# Patient Record
Sex: MALE | Race: WHITE | NOT HISPANIC OR LATINO | Employment: FULL TIME | URBAN - METROPOLITAN AREA
[De-identification: names, ages, dates, MRNs, and addresses within clinical notes are randomized per-mention and may not be internally consistent; named-entity substitution may affect disease eponyms.]

---

## 2020-09-03 NOTE — PROGRESS NOTES
SUBJECTIVE:   Leonel is a 20 year old male who presents to clinic today to establish care and for annual wellness exam.    Previously was being seen by Pediatric Services in [facility]  Going to start EMT course soon    - Last March on way home from college at start of pandemic, had an illness  - clammy, fever to 103, fine the next day, then developed shortness of breath. No other respiratory symptoms  - had COVID-19 antibody test in June which was negative    - is a rower in college  - restarted erging and strength training the week after his initial illness in March and hasn't really changed    - does strength training (pushups, pull ups, squats) 2-3 days week. No symptoms during strength training and feels that he is at his normal capacity with these exercises  - Erging 3 days a week, usually about an hour. How he feels doing this varies day to day without a clear reason. Some days is able to go at speed he was at in the Spring and other days will be much slower. Feels like he is having to put in much more effort to get the same speed on these slower days. Heart rate will also be faster during these slower days. Also feels like he is having a harder time breathing, having to take deeper breaths on those days (this particularly point has been less so the past month compared to earlier in the summer)  - running two to three times a week. Runs for 45 minutes two days a week and then hill sprints 30 minutes one day. During hill sprints he will consistently get uncomfortable left central chest pressure. Tries to push through it but eventually the pressure makes him stop. When resting, the pressure goes away after about 15 seconds, but if he tries to resume activity the pressure will restart immediately.    - when school was in session was erging an hour a day and strength training 2-3 days a week  - has not been able to return to that level of activity    - no coughing or wheezing  - no leg swelling  - no orthopnea or  PND  - MGM with asthma, otherwise no family or personal history of reactive airway disease  - never smoker  - one syncopal episode years ago after standing quickly    # Health Maintenance  - HIV Screening: March 2020, negative  - STI Screening: March 2020, negative, not sexually active since then  - BP:   BP Readings from Last 3 Encounters:   09/04/20 106/65   - (+) seatbelt use, (+) helmet, (+) smoke detector     Today's PHQ-2 Score:   PHQ-2 ( 1999 Pfizer) 9/4/2020   Q1: Little interest or pleasure in doing things 0   Q2: Feeling down, depressed or hopeless 0   PHQ-2 Score 0     Review of Systems:   Constitutional - no fevers, chills, night sweats, unintentional weight loss/gain   Eyes - no vision concerns   Ears/Nose/Throat - no hearing concerns, no dysphagia/odynophagia   Cardiovascular - as above, no palpitations   Pulmonary - as above, no wheezing, coughing   GI - no abdominal pain, constipation, diarrhea, nausea, vomiting    - no dysuria, polyuria, hematuria   Musculoskeletal - no joint or muscle pain  Integument - no rash   Neuro - no weakness, numbness, paresthesia   Heme - no easy bruising/bleeding   Endocrine - no polyuria, weight loss/gain, dry skin, excessive sweating, hair loss   Psychiatric - no feelings of depressed mood or anhedonia in past 2 weeks   Allergic/Immunologic - no history of anaphylaxis, no history of recurrent infections    History reviewed. No pertinent past medical history.  History reviewed. No pertinent surgical history.  Family History   Problem Relation Age of Onset     Hypertension Mother      No Known Problems Father      No Known Problems Brother      Asthma Maternal Grandmother      Atrial fibrillation Maternal Grandmother      Cerebrovascular Disease Maternal Grandmother      Cerebrovascular Disease Other      Cancer No family hx of      Heart Disease No family hx of      Diabetes No family hx of      Social History     Tobacco Use     Smoking status: Never Smoker      "Smokeless tobacco: Never Used   Substance Use Topics     Alcohol use: Never     Frequency: Never     Drug use: Never     Social History     Social History Narrative    Goes to [employer]    Taking off at least Fall 2020 due to pandemic    Living with parents    Rows for college team    Thinking of majoring in history/theater/philosophy    Think about teaching or law school       No current outpatient medications on file.     No current facility-administered medications for this visit.      I have reviewed the patient's past medical, surgical, family, and social history.     OBJECTIVE:   /65 (BP Location: Right arm, Patient Position: Sitting, Cuff Size: Adult Regular)   Pulse 64   Temp 97.4  F (36.3  C) (Skin)   Resp 14   Ht 1.929 m (6' 3.95\")   Wt 85.2 kg (187 lb 12 oz)   SpO2 97%   BMI 22.89 kg/m      Constitutional: well-appearing, appears stated age  Eyes: conjunctivae without erythema, sclera anicteric. Pupils equal, round, and reactive to light.   ENT: oropharynx clear, TM grey bilateral  Cardiac: regular rate and rhythm, normal S1/S2, no murmur/rubs/gallops  Respiratory: lungs clear to auscultation bilaterally, normal work of breathing, no wheezes/crackles  GI: abdomen soft, non-tender, non-distended  Extremities: warm and well perfused, radial pulses 2+ and equal, cap refill brisk.  Lymph: no cervical or supraclavicular lymphadenopathy  Skin: no rashes, lesions, or wounds  Psych: affect is full and appropriate, speech is fluent and non-pressured      ASSESSMENT AND PLAN:     COUNSELING:   Reviewed preventive health counseling, as reflected in patient instructions       Regular exercise       Healthy diet/nutrition       Immunizations    Vaccinated for: Human Papillomavirus      (Z00.00) Visit for well [place] health check  (primary encounter diagnosis)  Comment: Age appropriate screening and preventive services provided. TB screening for EMT course. BP normal. BMI normal. Depression screening " negative.   Plan: Quantiferon TB Gold Plus, VACCINE         ADMINISTRATION, INITIAL, HC HPV VAC 9V 3 DOSE         IM,     (R68.89) Decreased exercise tolerance  Comment: Concerning that this is limiting him from peak performance. ECG today showed Sinus bradycardia, 58 bpm, deep Q waves in III, aVR - presumably normal variant. No previous ECG available to compare. Will send to cardiology for additional evaluation given the effect on his performance. Of note, he had a normal holter and echo was November 2019 in [facility] system for an unrelated complaint.   Plan: CBC with Diff Plt ([facility]), Comprehensive         Metabolic Panel ([facility]), EKG 12-lead         complete w/read - Clinics          Kevon See MD  [facility]  09/04/2020, 2:34 PM

## 2020-09-04 ENCOUNTER — OFFICE VISIT (OUTPATIENT)
Dept: FAMILY MEDICINE | Facility: CLINIC | Age: 20
End: 2020-09-04
Payer: COMMERCIAL

## 2020-09-04 VITALS
TEMPERATURE: 97.4 F | HEIGHT: 76 IN | HEART RATE: 64 BPM | DIASTOLIC BLOOD PRESSURE: 65 MMHG | OXYGEN SATURATION: 97 % | BODY MASS INDEX: 22.86 KG/M2 | WEIGHT: 187.75 LBS | SYSTOLIC BLOOD PRESSURE: 106 MMHG | RESPIRATION RATE: 14 BRPM

## 2020-09-04 DIAGNOSIS — Z00.00 VISIT FOR WELL MAN HEALTH CHECK: Primary | ICD-10-CM

## 2020-09-04 DIAGNOSIS — R68.89 DECREASED EXERCISE TOLERANCE: ICD-10-CM

## 2020-09-04 LAB
% GRANULOCYTES: 62.6 %G (ref 40–75)
ALBUMIN SERPL-MCNC: 4.5 G/DL (ref 3.3–4.6)
ALP SERPL-CCNC: 104 U/L (ref 40–150)
ALT SERPL-CCNC: 23 U/L (ref 0–70)
AST SERPL-CCNC: 36 U/L (ref 0–55)
BILIRUB SERPL-MCNC: 0.9 MG/DL (ref 0.2–1.3)
BUN SERPL-MCNC: 12 MG/DL (ref 5–24)
CALCIUM SERPL-MCNC: 10 MG/DL (ref 8.5–10.4)
CHLORIDE SERPLBLD-SCNC: 105 MMOL/L (ref 94–109)
CO2 SERPL-SCNC: 28 MMOL/L (ref 20–32)
CREAT SERPL-MCNC: 1 MG/DL (ref 0.8–1.5)
EGFR CALCULATED (BLACK REFERENCE): 122.5
EGFR CALCULATED (NON BLACK REFERENCE): 101.3
ERYTHROCYTE [DISTWIDTH] IN BLOOD BY AUTOMATED COUNT: 12 %
GLUCOSE SERPL-MCNC: 88 MG/DL (ref 60–99)
GRANULOCYTES #: 4.1 K/UL (ref 1.6–8.3)
HCT VFR BLD AUTO: 39.1 % (ref 40–53)
HEMOGLOBIN: 12.8 G/DL (ref 13.3–17.7)
LYMPHOCYTES # BLD AUTO: 1.8 K/UL (ref 0.8–5.3)
LYMPHOCYTES NFR BLD AUTO: 28.3 %L (ref 20–48)
MCH RBC QN AUTO: 31.6 PG (ref 26.5–35)
MCHC RBC AUTO-ENTMCNC: 32.7 G/DL (ref 32–36)
MCV RBC AUTO: 96.5 FL (ref 78–100)
MID #: 0.6 K/UL (ref 0–2.2)
MID %: 9.1 %M (ref 0–20)
PLATELET # BLD AUTO: 306 K/UL (ref 150–450)
POTASSIUM SERPL-SCNC: 5.1 MMOL/L (ref 3.4–5.3)
PROT SERPL-MCNC: 7.2 G/DL (ref 6.8–8.8)
RBC # BLD AUTO: 4.05 M/UL (ref 4.4–5.9)
SODIUM SERPL-SCNC: 145 MMOL/L (ref 137.3–146.3)
WBC # BLD AUTO: 6.5 K/UL (ref 4–11)

## 2020-09-04 SDOH — HEALTH STABILITY: MENTAL HEALTH: HOW OFTEN DO YOU HAVE A DRINK CONTAINING ALCOHOL?: NEVER

## 2020-09-04 ASSESSMENT — MIFFLIN-ST. JEOR: SCORE: 1962.26

## 2020-09-04 NOTE — NURSING NOTE
"20 year old  Chief Complaint   Patient presents with     Physical       Blood pressure 106/65, pulse 64, temperature 97.4  F (36.3  C), temperature source Skin, resp. rate 14, height 1.929 m (6' 3.95\"), weight 85.2 kg (187 lb 12 oz), SpO2 97 %. Body mass index is 22.89 kg/m .  There is no problem list on file for this patient.      Wt Readings from Last 2 Encounters:   09/04/20 85.2 kg (187 lb 12 oz)     BP Readings from Last 3 Encounters:   09/04/20 106/65         No current outpatient medications on file.     No current facility-administered medications for this visit.        Social History     Tobacco Use     Smoking status: Never Smoker     Smokeless tobacco: Never Used   Substance Use Topics     Alcohol use: Never     Frequency: Never     Drug use: Never       Health Maintenance Due   Topic Date Due     PREVENTIVE CARE VISIT  2000     HIV SCREENING  03/13/2015     HPV IMMUNIZATION (3 - Male 3-dose series) 12/14/2018     PHQ-2  01/01/2020     INFLUENZA VACCINE (1) 09/01/2020       No results found for: PAP      September 4, 2020 1:50 PM    "

## 2020-09-08 LAB
GAMMA INTERFERON BACKGROUND BLD IA-ACNC: 0.04 IU/ML
M TB IFN-G CD4+ BCKGRND COR BLD-ACNC: 9.96 IU/ML
M TB TUBERC IFN-G BLD QL: NEGATIVE
MITOGEN IGNF BCKGRD COR BLD-ACNC: 0.01 IU/ML
MITOGEN IGNF BCKGRD COR BLD-ACNC: 0.02 IU/ML

## 2020-09-18 DIAGNOSIS — D64.9 NORMOCYTIC ANEMIA: Primary | ICD-10-CM

## 2020-09-18 PROBLEM — R68.89 DECREASED EXERCISE TOLERANCE: Status: ACTIVE | Noted: 2020-09-18

## 2020-09-28 ENCOUNTER — ALLIED HEALTH/NURSE VISIT (OUTPATIENT)
Dept: FAMILY MEDICINE | Facility: CLINIC | Age: 20
End: 2020-09-28
Payer: COMMERCIAL

## 2020-09-28 DIAGNOSIS — Z23 NEED FOR PROPHYLACTIC VACCINATION AND INOCULATION AGAINST INFLUENZA: Primary | ICD-10-CM

## 2020-09-28 DIAGNOSIS — Z00.00 VISIT FOR WELL MAN HEALTH CHECK: ICD-10-CM

## 2020-09-28 NOTE — PROGRESS NOTES
"Injectable Influenza Immunization Documentation    1.  Has the patient received the information for the injectable influenza vaccine? YES     2. Is the patient 6 months of age or older? YES     3. Does the patient have any of the following contraindications?         Severe allergy to eggs? No     Severe allergic reaction to previous influenza vaccines? No   Severe allergy to latex? No       History of Guillain-[place] syndrome? No     Currently have a temperature greater than 100.4F? No        4.  Severely egg allergic patients should have flu vaccine eligibility assessed by an MD, RN, or pharmacist, and those who received flu vaccine should be observed for 15 min by an MD, RN, Pharmacist, Medical Technician, or member of clinic staff.\": YES    5. Latex-allergic patients should be given latex-free influenza vaccine Yes. Please reference the Vaccine latex table to determine if your clinic s product is latex-containing.       Vaccination given by Carmelita Salgado CMA,[facility]  September 28, 2020 12:04 PM        Prior to immunization administration, verified patients identity using patient s name and date of birth. Please see Immunization Activity for additional information.     Screening Questionnaire for Adult Immunization    Are you sick today?   No   Do you have allergies to medications, food, a vaccine component or latex?   No   Have you ever had a serious reaction after receiving a vaccination?   No   Do you have a long-term health problem with heart, lung, kidney, or metabolic disease (e.g., diabetes), asthma, a blood disorder, no spleen, complement component deficiency, a cochlear implant, or a spinal fluid leak?  Are you on long-term aspirin therapy?   No   Do you have cancer, leukemia, HIV/AIDS, or any other immune system problem?   No   Do you have a parent, brother, or sister with an immune system problem?   No   In the past 3 months, have you taken medications that affect  your immune system, such as prednisone, other " steroids, or anticancer drugs; drugs for the treatment of rheumatoid arthritis, Crohn s disease, or psoriasis; or have you had radiation treatments?   No   Have you had a seizure, or a brain or other nervous system problem?   No   During the past year, have you received a transfusion of blood or blood    products, or been given immune (gamma) globulin or antiviral drug?   No   For women: Are you pregnant or is there a chance you could become       pregnant during the next month?   No   Have you received any vaccinations in the past 4 weeks?   No     Immunization questionnaire answers were all negative.        Per orders of Dr. See, injection of 3rd HPV given by Carmelita Salgado CMA. Patient instructed to remain in clinic for 15 minutes afterwards, and to report any adverse reaction to me immediately.       Screening performed by Carmelita Salgado CMA on 9/28/2020 at 12:04 PM.      Leonel Retana comes into clinic today at the request of Dr. See Ordering Provider for HPV and Flu shot.    This service provided today was under the supervising provider of the day Dr. Aguilar, who was available if needed.    Carmelita Salgado CMA

## 2020-09-30 DIAGNOSIS — D64.9 NORMOCYTIC ANEMIA: ICD-10-CM

## 2020-09-30 LAB
BASOPHILS # BLD AUTO: 0 10E9/L (ref 0–0.2)
BASOPHILS NFR BLD AUTO: 0.3 %
DIFFERENTIAL METHOD BLD: ABNORMAL
EOSINOPHIL # BLD AUTO: 0.1 10E9/L (ref 0–0.7)
EOSINOPHIL NFR BLD AUTO: 0.8 %
ERYTHROCYTE [DISTWIDTH] IN BLOOD BY AUTOMATED COUNT: 12 % (ref 10–15)
FERRITIN SERPL-MCNC: 91 NG/ML (ref 26–388)
FOLATE SERPL-MCNC: 16.4 NG/ML
HAPTOGLOB SERPL-MCNC: 60 MG/DL (ref 32–197)
HCT VFR BLD AUTO: 40.9 % (ref 40–53)
HGB BLD-MCNC: 13.6 G/DL (ref 13.3–17.7)
IMM GRANULOCYTES # BLD: 0 10E9/L (ref 0–0.4)
IMM GRANULOCYTES NFR BLD: 0.2 %
LYMPHOCYTES # BLD AUTO: 1.8 10E9/L (ref 0.8–5.3)
LYMPHOCYTES NFR BLD AUTO: 31 %
MCH RBC QN AUTO: 33.2 PG (ref 26.5–33)
MCHC RBC AUTO-ENTMCNC: 33.3 G/DL (ref 31.5–36.5)
MCV RBC AUTO: 100 FL (ref 78–100)
MONOCYTES # BLD AUTO: 0.7 10E9/L (ref 0–1.3)
MONOCYTES NFR BLD AUTO: 11.8 %
NEUTROPHILS # BLD AUTO: 3.3 10E9/L (ref 1.6–8.3)
NEUTROPHILS NFR BLD AUTO: 55.9 %
NRBC # BLD AUTO: 0 10*3/UL
NRBC BLD AUTO-RTO: 0 /100
PLATELET # BLD AUTO: 236 10E9/L (ref 150–450)
RBC # BLD AUTO: 4.1 10E12/L (ref 4.4–5.9)
RETICS # AUTO: 24.6 10E9/L (ref 25–95)
RETICS/RBC NFR AUTO: 0.6 % (ref 0.5–2)
VIT B12 SERPL-MCNC: 528 PG/ML (ref 193–986)
WBC # BLD AUTO: 5.9 10E9/L (ref 4–11)

## 2020-10-01 LAB — COPATH REPORT: NORMAL

## 2020-12-23 ENCOUNTER — OFFICE VISIT (OUTPATIENT)
Dept: FAMILY MEDICINE | Facility: CLINIC | Age: 20
End: 2020-12-23
Payer: COMMERCIAL

## 2020-12-23 VITALS
TEMPERATURE: 96.8 F | WEIGHT: 196 LBS | HEART RATE: 54 BPM | SYSTOLIC BLOOD PRESSURE: 94 MMHG | OXYGEN SATURATION: 99 % | DIASTOLIC BLOOD PRESSURE: 59 MMHG | RESPIRATION RATE: 14 BRPM | BODY MASS INDEX: 23.89 KG/M2

## 2020-12-23 DIAGNOSIS — M54.50 BILATERAL LOW BACK PAIN WITHOUT SCIATICA, UNSPECIFIED CHRONICITY: Primary | ICD-10-CM

## 2020-12-23 NOTE — PROGRESS NOTES
"Leonel (Bob) LUCIANO Retana is a 20 year old male who presents to [facility] today for back pain.     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   First noticed back pain about 2 years ago.   Then, noticed it more with rowing in college, especially with double-day practices.   He was able to resolve it with stretching and cycling.     Now, taking the year off of college due to covid and the pain has recurred. Now, pain feels different. Now, more of a discomfort.   More on the midline to the LEFT buttocks. Was worse with weight lifting. Also, aggravated with prolonged sitting and sleeping on a soft surface.   Now, following Elver Ribeiro's book and that's helping.   States that his pain at its worse is 1.5/10. He's just bothered by the consistency.   States that his personality is one where he needs to know what's causing his pains and he's unsatisfied until he knows.   And, Bob is still unclear as to what is causing the pain.     Feels a little better with sleeping on a harder bed.     Review Of Systems:  Feels otherwise \"great.\" No fevers, sweats, chills. No weight loss.   Has otherwise been in usual state of health, e.g.   Cardiovascular: negative  Respiratory: No shortness of breath, dyspnea on exertion, cough, or hemoptysis  Gastrointestinal: negative  Genitourinary: negative      Problem list per EMR:  Patient Active Problem List   Diagnosis     Decreased exercise tolerance       No current outpatient medications on file.       Allergies   Allergen Reactions     Sulfa Drugs Rash          Social:     Went to [employer].   Attends [employer] in [place]     EXAM    Vitals: BP 94/59 (BP Location: Right arm, Patient Position: Sitting, Cuff Size: Adult Large)   Pulse 54   Temp 96.8  F (36  C) (Skin)   Resp 14   Wt 88.9 kg (196 lb)   SpO2 99%   BMI 23.89 kg/m    BMI= Body mass index is 23.89 kg/m .    Bob appears as a physically fit and well-appearing 20-year-old in no distress.  He stands " "at approximately 6 foot 4 inches tall.    Sits comfortably and is able to rise from a seated position without the use of his hands and with no distress.  He can stand with heel raises without any weakness.  His gait appears normal.    Area of discomfort is over the left low back at around the L4-L5 to SI joint region.  Also some discomfort over the right SI joint.    Can forward bend and touch his toes.    Hyperextension is intact without any discomfort.  He can even do's single leg stance with hyperextension on either leg and without any worsening of his pain.   Hips have full pain-free range of motion.  Straight leg raise is negative.  His hamstring flexibility is with a popliteal angle of approximately 155 degrees when lying.  Achilles and patellar reflexes are +1 and bilaterally symmetrical.  Sensation and strength is intact distally.    ASSESSMENT/PLAN:    21 yo with low back pain. Very unlikely for a spondylolysis. He has no pain with hyperextension. No pain with single leg stance.   Negative SLR. No weakness distally. No systemic red flags.     Discussed at length, the multiple potential causes of low back pain     Offered an X-ray and also discussed an MRI. He didn't want to pursue either and is in fact, about to return to [place] in 2 days from now.     Suggested returning to see a physical therapist.     Discussed worrying signs, such as weakness or limited range of motion or systemic symptoms. Needs to seek further medical care if any of these occur.     Also, he wants to know some things that can help keep spine pain at its minimum: Recommended - Morning postural positioning, standing desk usage, high chairs (he's 6'4\") and high computer screen height and afternoon postural assessment (brief).     Also, at the very end, he mentioned previous shin splints: Mentioned mid- arch taping    Over 50% of the 30 minutes in room time was spent discussing the above treatment and diagnostic plan      --Zaire " MD Bindu  [facility], Department of Family Medicine and Community Health

## 2020-12-23 NOTE — NURSING NOTE
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 rate 14, weight 88.9 kg (196 lb), SpO2 99 %. Body mass index is 23.89 kg/m .  Patient Active Problem List   Diagnosis     Decreased exercise tolerance       Wt Readings from Last 2 Encounters:   12/23/20 88.9 kg (196 lb)   09/04/20 85.2 kg (187 lb 12 oz)     BP Readings from Last 3 Encounters:   12/23/20 94/59   09/04/20 106/65         No current outpatient medications on file.     No current facility-administered medications for this visit.        Social History     Tobacco Use     Smoking status: Never Smoker     Smokeless tobacco: Never Used   Substance Use Topics     Alcohol use: Never     Frequency: Never     Drug use: Never       Health Maintenance Due   Topic Date Due     HEPATITIS C SCREENING  03/13/2018       No results found for: PAP      December 23, 2020 9:36 AM

## 2020-12-27 NOTE — PATIENT INSTRUCTIONS
"ASSESSMENT/PLAN:    21 yo with low back pain. Very unlikely for a spondylolysis. He has no pain with hyperextension. No pain with single leg stance.   Negative SLR. No weakness distally. No systemic red flags.     Discussed at length, the multiple potential causes of low back pain     Offered an X-ray and also discussed an MRI. He didn't want to pursue either and is in fact, about to return to [place] in 2 days from now.     Suggested returning to see a physical therapist.     Discussed worrying signs, such as weakness or limited range of motion or systemic symptoms. Needs to seek further medical care if any of these occur.     Also, he wants to know some things that can help keep spine pain at its minimum: Recommended - Morning postural positioning, standing desk usage, high chairs (he's 6'4\") and high computer screen height and afternoon postural assessment (brief).       Also, at the very end, he mentioned previous shin splints: Mentioned mid- arch taping    --Zaire Ruano MD  [facility], Department of Family Medicine and Community Health  "
Calm/Appropriate

## 2021-04-06 ENCOUNTER — TELEPHONE (OUTPATIENT)
Dept: FAMILY MEDICINE | Facility: CLINIC | Age: 21
End: 2021-04-06

## 2021-04-06 NOTE — TELEPHONE ENCOUNTER
Who is calling? Patient and mother      Reason for Call: patient called and wanted office visit from 12/23 faxed over to [facility] at [facility]: [phone], this has been faxed.

## 2021-08-25 NOTE — PROGRESS NOTES
"ASSESSMENT AND PLAN:     (B35.3) Tinea pedis of right foot  (primary encounter diagnosis)  Comment: New, self-limited diagnosis. Restart topical Lamisil and keep foot dry.   Plan:     (Z23) Need for vaccination  Comment:   Plan: TDAP (ADACEL,BOOSTRIX)          (M25.561,  M25.562) Pain in both knees, unspecified chronicity\  (W57.XXXA) Tick bite, initial encounter  Comment: Reassured Leonel that the rash he had while tick attached was almost certainly just a bite reaction given the timeline and not EM. Knee pains also unlikely to be Lyme related but did have \"tiny\" effusions on x-ray in May and he has had substantial tick exposures. Will test for serologies.   Plan: Lyme Disease [place] with reflex to WB Serum    (R19.7) Diarrhea of presumed infectious origin  Comment: 2 weeks of loose stools, improved this week compared to last but recently stable. Concern for giardia/crypto given his fresh water consumption. Leaving tomorrow to return to school and does not live nearby so logistically not possible to get stool sample. Encouraged [place] if symptoms don't resolve over next week to get stool testing.   Plan:     Kevon See MD   [facility]  08/27/2021, 2:23 PM      SUBJECTIVE:   Leonel is a 21 year old male who presents to clinic today for a return visit.    - [place]    # Foot Problem  - was in wet boots frequently over the summer  - right foot would get moist, cracked, painful skin  - used topical antifungal (unknown which one) for a while but best thing seemed to be keeping foot dry.     # Diarrhea  - about 2 weeks  - sometimes approach watery stools mostly just really runny, 2-3 times a day  - recently down to just 2 bowel movements a day, sometimes a little more formed but then returns to being very loose  - no blood/mucous in stools  - had some stomach \"tightness\" and discomfort and loss of appetite last week that has " "resolved now  - no fevers/chills/sweats    # Tick Bite  - Spring 2021 was outside, found a tick later, had a small purple ring around tick while attached  - was given a single dose of prophylactic amoxicillin by another provider  - no further rash after that  - has chronic bilateral intermittent knee pain - saw rheum for this in May, had x-rays with \"tiny\" effusions  - doesn't get visible swelling/redness/warmth in knees or other joints     ROS: Denies fevers, chills, chest pain, difficulty breathing, abdominal pain    Patient Active Problem List   Diagnosis     Decreased exercise tolerance     No current outpatient medications on file.     No current facility-administered medications for this visit.       I have reviewed the patient's relevant past medical history.     OBJECTIVE:   /67 (BP Location: Left arm, Patient Position: Sitting, Cuff Size: Adult Regular)   Pulse 64   Temp 97.2  F (36.2  C) (Skin)   Resp 13   Wt 85.7 kg (189 lb)   SpO2 95%   BMI 23.04 kg/m      Constitutional: well-appearing, appears stated age  Eyes: conjunctivae without erythema, sclera anicteric.   Psych: affect is full and appropriate, speech is fluent and non-pressured    Right Foot: macerated pale skin between 4th and 5th digits, no cracking, no erythema, no purulence  "

## 2021-08-27 ENCOUNTER — OFFICE VISIT (OUTPATIENT)
Dept: FAMILY MEDICINE | Facility: CLINIC | Age: 21
End: 2021-08-27
Payer: COMMERCIAL

## 2021-08-27 VITALS
BODY MASS INDEX: 23.04 KG/M2 | HEART RATE: 64 BPM | RESPIRATION RATE: 13 BRPM | DIASTOLIC BLOOD PRESSURE: 67 MMHG | WEIGHT: 189 LBS | SYSTOLIC BLOOD PRESSURE: 108 MMHG | TEMPERATURE: 97.2 F | OXYGEN SATURATION: 95 %

## 2021-08-27 DIAGNOSIS — B35.3 TINEA PEDIS OF RIGHT FOOT: Primary | ICD-10-CM

## 2021-08-27 DIAGNOSIS — R19.7 DIARRHEA OF PRESUMED INFECTIOUS ORIGIN: ICD-10-CM
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
## 2021-08-27 DIAGNOSIS — M25.562 PAIN IN BOTH KNEES, UNSPECIFIED CHRONICITY: ICD-10-CM

## 2021-08-27 DIAGNOSIS — Z23 NEED FOR VACCINATION: ICD-10-CM

## 2021-08-27 DIAGNOSIS — M25.561 PAIN IN BOTH KNEES, UNSPECIFIED CHRONICITY: ICD-10-CM

## 2021-08-27 PROCEDURE — 86618 LYME DISEASE ANTIBODY: CPT | Performed by: FAMILY MEDICINE

## 2021-08-27 NOTE — NURSING NOTE
21 year old  Chief Complaint   Patient presents with     Trench Foot     possibly bilateral pinky toes     Diarrhea     2 episodes daily x 14 days       Blood pressure 108/67, pulse 64, temperature 97.2  F (36.2  C), temperature source Skin, resp. rate 13, weight 85.7 kg (189 lb), SpO2 95 %. Body mass index is 23.04 kg/m .  Patient Active Problem List   Diagnosis     Decreased exercise tolerance       Wt Readings from Last 2 Encounters:   08/27/21 85.7 kg (189 lb)   12/23/20 88.9 kg (196 lb)     BP Readings from Last 3 Encounters:   08/27/21 108/67   12/23/20 94/59   09/04/20 106/65         No current outpatient medications on file.     No current facility-administered medications for this visit.       Social History     Tobacco Use     Smoking status: Never Smoker     Smokeless tobacco: Never Used   Substance Use Topics     Alcohol use: Never     Drug use: Never       Health Maintenance Due   Topic Date Due     ADVANCE CARE PLANNING  Never done     HEPATITIS C SCREENING  Never done     INFLUENZA VACCINE (1) 09/01/2021     PREVENTIVE CARE VISIT  09/04/2021       No results found for: PAP      August 27, 2021 2:06 PM

## 2021-08-27 NOTE — NURSING NOTE
Prior to immunization administration, verified patients identity using patient s name and date of birth. Please see Immunization Activity for additional information.     Screening Questionnaire for Adult Immunization    Are you sick today?   No   Do you have allergies to medications, food, a vaccine component or latex?   No   Have you ever had a serious reaction after receiving a vaccination?   No   Do you have a long-term health problem with heart, lung, kidney, or metabolic disease (e.g., diabetes), asthma, a blood disorder, no spleen, complement component deficiency, a cochlear implant, or a spinal fluid leak?  Are you on long-term aspirin therapy?   No   Do you have cancer, leukemia, HIV/AIDS, or any other immune system problem?   No   Do you have a parent, brother, or sister with an immune system problem?   No   In the past 3 months, have you taken medications that affect  your immune system, such as prednisone, other steroids, or anticancer drugs; drugs for the treatment of rheumatoid arthritis, Crohn s disease, or psoriasis; or have you had radiation treatments?   No   Have you had a seizure, or a brain or other nervous system problem?   No   During the past year, have you received a transfusion of blood or blood    products, or been given immune (gamma) globulin or antiviral drug?   No   For women: Are you pregnant or is there a chance you could become       pregnant during the next month?   No   Have you received any vaccinations in the past 4 weeks?   No     Immunization questionnaire answers were all negative.        Per orders of Dr. See, injection of Tdap given by Lashay Kaba CMA. Patient instructed to remain in clinic for 15 minutes afterwards, and to report any adverse reaction to me immediately.       Screening performed by Lashay Kaba CMA on 8/27/2021 at 2:45 PM.

## 2021-08-30 LAB — B BURGDOR IGG+IGM SER QL: 0.18

## 2021-09-18 ENCOUNTER — HEALTH MAINTENANCE LETTER (OUTPATIENT)
Age: 21
End: 2021-09-18

## 2021-11-13 ENCOUNTER — HEALTH MAINTENANCE LETTER (OUTPATIENT)
Age: 21
End: 2021-11-13

## 2022-02-11 ENCOUNTER — TELEPHONE (OUTPATIENT)
Dept: FAMILY MEDICINE | Facility: CLINIC | Age: 22
End: 2022-02-11
Payer: COMMERCIAL

## 2022-02-11 DIAGNOSIS — M25.522 LEFT ELBOW PAIN: ICD-10-CM

## 2022-02-11 DIAGNOSIS — M25.512 CHRONIC LEFT SHOULDER PAIN: Primary | ICD-10-CM

## 2022-02-11 DIAGNOSIS — G89.29 CHRONIC LEFT SHOULDER PAIN: Primary | ICD-10-CM

## 2022-02-11 DIAGNOSIS — M54.6 PAIN IN THORACIC SPINE: ICD-10-CM

## 2022-02-11 NOTE — TELEPHONE ENCOUNTER
Called and LVM - patient requesting PT orders for back pain. No recent visit associated with back pain. Pt following [facility] for PT. Last PT referral was 1/25/21 for LBP. Unsure if pt needs to be seen again or if this ongoing problem.     Call back to speak to clinic RN.     Patient out of state - student in [place].     Kym Monahan MS RN-BC  02/11/22  11:24 AM

## 2022-02-11 NOTE — TELEPHONE ENCOUNTER
Patient requesting PT for L shoulder, L elbow, and thoracic spine. 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 Has recurring problems with L shoulder and L elbow due to overuse/exercise. He is not having back pain, but states a lot of sitting and slouching as a student, and tall and feels he could benefit from some back strengthening. PT notes from 5/7/20 about similar issues.     Dr. See - are you OK with PT orders?     Referral sent to [facility]  Fax [phone]    Kym Monahan MS RN-BC  02/11/22  3:02 PM

## 2022-02-11 NOTE — TELEPHONE ENCOUNTER
Who is calling? Patient  What do they need? Referral for PT  Is this an insurance referral? No  Does caller need a call back? No  Additional Comments: Thoracic spine pain  Referral sent to [facility]  Fax [phone]

## 2022-03-15 PROBLEM — D64.9 NORMOCYTIC ANEMIA: Chronic | Status: ACTIVE | Noted: 2022-03-15

## 2022-05-24 PROBLEM — M25.562 ARTHRALGIA OF BOTH KNEES: Status: ACTIVE | Noted: 2022-05-24

## 2022-05-24 PROBLEM — M25.561 ARTHRALGIA OF BOTH KNEES: Status: ACTIVE | Noted: 2022-05-24

## 2022-05-24 NOTE — PROGRESS NOTES
ASSESSMENT AND PLAN:     COUNSELING:   Reviewed preventive health counseling, as reflected in patient instructions       Safe sex practices/STD prevention       Consider Hep C screening for all patients one time for ages 18-79 years    (Z00.00) Visit for well man health check  (primary encounter diagnosis)  Comment: Age appropriate screening and preventive services provided.   I am clearing Leonel Sousa Raylor for sports/camp participation.   He would benefit from additional cardiology evaluation in the future but his participation in the [employer] camp is not conditional on this.     (R55) Pre-syncope  (R07.9) Exertional chest pain  Comment: Referring to cardiology to discuss additional evaluation. Was previously recommended to have CTA coronary with consideration of cardiac MRI. Due to improvement in symptoms with conditioning I am clearing him for participation in camp, which is expected to be less intense than his usual work outs .  Plan: Cardiology Adult Referral -         [place]    (Z11.59) Encounter for hepatitis C screening test for low risk patient  Plan: Hepatitis C Screen Reflex to HCV RNA Quant and         Genotype    (D64.9) Normocytic anemia  Comment: Chronic, stable for years now. Etiology unknown. Repeat labs today. Will refer to hematology if changes in cell counts.   Plan: CBC with platelets differential    (Z11.3) Routine screening for STI (sexually transmitted infection)  Plan: NEISSERIA GONORRHOEA PCR, CHLAMYDIA TRACHOMATIS        PCR            Kevon See MD  [facility]  05/26/2022, 11:10 AM      SUBJECTIVE:   Leonel is a 22 year old male who presents to clinic today for an annual wellness exam.    About to start camp again, [employer]  Leading backpacking trips  Going to [employer] in the Fall    - until a few months ago, was doing mostly body-weight exercises  - a few months ago, started incorporating weights  - at first, he noticed even with light weights  while lowering weights would feel lightheaded, losing vision, dizzy  - With heavier weights, symptoms would be worse and would sometimes have to go down on a knee after lowering weights  - these symptoms have improved as he has continued to lift weights but have not resolved. Doesn't happen every time now, now just with heavier routines     - if pushes into high heart range (e.g. hill sprints), he will consistently get pressure in center of chest and a little to the left  - had sudden onset sensation of heart racing for 20 seconds while at rest, 5-6 times last year  - has not happened this year    - has history of syncope twice in his life. Both times occurred during a voice exercise, head down between knees and then straightening up. During straightening up, passed out. Last time was a year ago.    - saw cardiology in 2019 and 12/20/20 with [facility] for these symptoms  - had Zio patch for 14 days, EKG, and echo, which were all normal  - also recommend to have a CT coronary angiogram which was not done      He has no known history of concussion or postconcussive symptoms, hypertrophic or dilated cardiomyopathy, heart murmur, long QT syndrome, seizure disorder, recurrent episodes of upper extremity burning/weakness, hypertension, sickle cell disease, coronary artery disease, Marfan syndrome, or arrhythmia.    He does not have a history of asthma.  He does have a history of fracture - 4th and 5th fingers, right arm, all as a child.     He has never been restricted from sports participation by a physician/ in the past.    He has no family history of sudden unexplained death before age 50 or death/disability related to heart disease before age 50.  He has no family history of anyone needing a pacemaker or implantable defibrillator before age 50.   He has no family history of hypertrophic or dilated cardiomyopathy, long or short QT syndrome, Marfan syndrome, arrhythmogenic right ventricular cardiomyopathy,  significant arrhythmias, Brugada syndrome, or other genetic cardiac conditions.    # Bilateral Knee Pain  - has been taking it easy and strengthening this Spring  - joint pains have resolved    # Nicotine Use  - Cigarettes <1 pack a year  - Pouches of nicotine (not tobacco) about 4 times a week  - Vaping every 3 weeks or so    # Normocytic Anemia  - 19 Hgb 12.7, RBC 3.94, , Platelets 289  - 20 Hgb 12.8, MCV 96.5, Platelets 306  - 20 Hgb 13.6, , Platelets 236, Retic Count 24.6 (0.6%, Retic Index 0.52)  - 21 Hgb 12.9, MCV 99.5, Platelets 235    - 21 ESR 5, CRP <3.0  - 20 B12 528, Folate 16.4, Ferritin 91  - 20 Haptoglobin 60, Tbili 0.9  - 20 Creatinine 1.0, ALT 23, AST 36, Albumin 4.5  - 19 TSH 1.11    20 Peripheral Smear  Peripheral Blood   The red blood cells appear normochromic. Poikilocytosis is minimal.   Polychromasia is not increased. Rouleaux   formation is not increased. The morphology of the platelets is normal.   Neutrophils show unremarkable cytoplasmic granulation and nuclear   morphology; no neutrophils with   hypersegmented nuclei are seen.     # Health Maintenance  - HIV Screening: has had previously, declines today  - STI Screening: do today  - Hep C Screening: do today  - BP:   BP Readings from Last 3 Encounters:   22 103/66   21 108/67   20 94/59   - Cholesterol: no indications  - Diabetes Screenin21 random glucose 98  - (+) seatbelt use, doesn't bike  - 6 days a week, mix of weights (3 days) and cardio (3 days, 30-40 minutes at a time)    Today's PHQ-2 Score:   PHQ-2 (  [employer]) 2022   Q1: Little interest or pleasure in doing things 0 0   Q2: Feeling down, depressed or hopeless 0 0   PHQ-2 Score 0 0   PHQ-2 Total Score (12-17 Years)- Positive if 3 or more points; Administer PHQ-A if positive - 0       Review of Systems:   Constitutional - no fevers, chills, night sweats,  unintentional weight loss/gain   Eyes - no vision concerns   Ears/Nose/Throat - no hearing concerns   Cardiovascular - as above   Pulmonary - no excessive shortness of breath with exercise, no wheezing or coughing during or after exercise   GI - no abdominal pain    - no dysuria, polyuria   Musculoskeletal - no current joint or muscle pain, no history of recurrent ankle sprain  Integument - no current or recurring rashes, no history of herpes or MRSA   Neuro - no weakness, numbness, paresthesia   Heme - no history of easy bruising/bleeding   Allergic/Immunologic - no history of anaphylaxis, no history of recurrent infections      History reviewed. No pertinent past medical history.  History reviewed. No pertinent surgical history.  Family History   Problem Relation Age of Onset     Hypertension Mother      No Known Problems Father      No Known Problems Brother      Asthma Maternal Grandmother      Atrial fibrillation Maternal Grandmother      Cerebrovascular Disease Maternal Grandmother 65     Cerebrovascular Disease Paternal Grandmother 75     Cancer No family hx of      Heart Disease No family hx of      Diabetes No family hx of      Social History     Tobacco Use     Smoking status: Never Smoker     Smokeless tobacco: Never Used   Vaping Use     Vaping Use: Some days     Substances: Nicotine, Flavoring     Devices: Pre-filled or refillable cartridge   Substance Use Topics     Alcohol use: Yes     Drug use: Yes     Types: Marijuana     Social History     Social History Narrative    Goes to [employer], rising [employer] as of 05/2022    Studying Fall 2022 in [employer], Spring 2023 in [place]    Thinking of majoring in [employer]    Think about teaching or law school       Current Outpatient Medications   Medication     Misc Natural Products (T-RELIEF CBD+13 SL)     No current facility-administered medications for this visit.     I have reviewed the patient's past medical, surgical, family, and social history.  "    OBJECTIVE:   /66 (BP Location: Left arm, Patient Position: Sitting, Cuff Size: Adult Large)   Pulse 74   Temp 97.3  F (36.3  C) (Skin)   Resp 12   Ht 1.95 m (6' 4.77\")   Wt 90.3 kg (199 lb)   SpO2 98%   BMI 23.74 kg/m      Ht Readings from Last 3 Encounters:   05/26/22 1.95 m (6' 4.77\")   09/04/20 1.929 m (6' 3.95\")       Constitutional: well-appearing, appears stated age  Eyes: conjunctivae without erythema, sclera anicteric. Pupils equal, round, and reactive to light.   ENT: oropharynx clear, TM grey bilateral  Cardiac: regular rate and rhythm, normal S1/S2, no murmur/rubs/gallops  Respiratory: lungs clear to auscultation bilaterally, normal work of breathing, no wheezes/crackles  GI: abdomen soft, non-tender, non-distended  Extremities: warm and well perfused, radial pulses 2+ and equal, cap refill brisk.  Lymph: no cervical or supraclavicular lymphadenopathy  Skin: no rashes, lesions, or wounds  Psych: affect is full and appropriate, speech is fluent and non-pressured  "

## 2022-05-26 ENCOUNTER — OFFICE VISIT (OUTPATIENT)
Dept: FAMILY MEDICINE | Facility: CLINIC | Age: 22
End: 2022-05-26
Payer: COMMERCIAL

## 2022-05-26 VITALS
WEIGHT: 199 LBS | HEIGHT: 77 IN | OXYGEN SATURATION: 98 % | RESPIRATION RATE: 12 BRPM | TEMPERATURE: 97.3 F | DIASTOLIC BLOOD PRESSURE: 66 MMHG | HEART RATE: 74 BPM | SYSTOLIC BLOOD PRESSURE: 103 MMHG | BODY MASS INDEX: 23.5 KG/M2

## 2022-05-26 DIAGNOSIS — Z00.00 VISIT FOR WELL MAN HEALTH CHECK: Primary | ICD-10-CM

## 2022-05-26 DIAGNOSIS — R07.9 EXERTIONAL CHEST PAIN: ICD-10-CM

## 2022-05-26 DIAGNOSIS — Z11.59 ENCOUNTER FOR HEPATITIS C SCREENING TEST FOR LOW RISK PATIENT: ICD-10-CM

## 2022-05-26 DIAGNOSIS — R55 PRE-SYNCOPE: ICD-10-CM

## 2022-05-26 DIAGNOSIS — D64.9 NORMOCYTIC ANEMIA: ICD-10-CM

## 2022-05-26 DIAGNOSIS — Z11.3 ROUTINE SCREENING FOR STI (SEXUALLY TRANSMITTED INFECTION): ICD-10-CM

## 2022-05-26 PROBLEM — M25.561 ARTHRALGIA OF BOTH KNEES: Status: RESOLVED | Noted: 2022-05-24 | Resolved: 2022-05-26

## 2022-05-26 PROBLEM — R68.89 DECREASED EXERCISE TOLERANCE: Status: RESOLVED | Noted: 2020-09-18 | Resolved: 2022-05-26

## 2022-05-26 PROBLEM — M25.562 ARTHRALGIA OF BOTH KNEES: Status: RESOLVED | Noted: 2022-05-24 | Resolved: 2022-05-26

## 2022-05-26 LAB
BASO+EOS+MONOS # BLD AUTO: 0.7 10E3/UL (ref 0–2.2)
BASO+EOS+MONOS NFR BLD AUTO: 11 %
ERYTHROCYTE [DISTWIDTH] IN BLOOD BY AUTOMATED COUNT: 11.5 % (ref 10–15)
HCT VFR BLD AUTO: 40.5 % (ref 40–53)
HGB BLD-MCNC: 13.3 G/DL (ref 13.3–17.7)
LYMPHOCYTES # BLD AUTO: 1.8 10E3/UL (ref 0.8–5.3)
LYMPHOCYTES NFR BLD AUTO: 29 %
MCH RBC QN AUTO: 32.2 PG (ref 26.5–33)
MCHC RBC AUTO-ENTMCNC: 32.8 G/DL (ref 31.5–36.5)
MCV RBC AUTO: 98 FL (ref 78–100)
NEUTROPHILS # BLD AUTO: 3.7 10E3/UL (ref 1.6–8.3)
NEUTROPHILS NFR BLD AUTO: 60 %
PLATELET # BLD AUTO: 314 10E3/UL (ref 150–450)
RBC # BLD AUTO: 4.13 10E6/UL (ref 4.4–5.9)
WBC # BLD AUTO: 6.2 10E3/UL (ref 4–11)

## 2022-05-26 PROCEDURE — 86803 HEPATITIS C AB TEST: CPT | Performed by: FAMILY MEDICINE

## 2022-05-26 PROCEDURE — 87591 N.GONORRHOEAE DNA AMP PROB: CPT | Performed by: FAMILY MEDICINE

## 2022-05-26 PROCEDURE — 87491 CHLMYD TRACH DNA AMP PROBE: CPT | Performed by: FAMILY MEDICINE

## 2022-05-26 RX ORDER — DEXMETHYLPHENIDATE HYDROCHLORIDE 10 MG/1
10 CAPSULE, EXTENDED RELEASE ORAL DAILY PRN
COMMUNITY
Start: 2022-01-26 | End: 2022-05-26

## 2022-05-26 NOTE — PATIENT INSTRUCTIONS
Cardiology will call you to schedule  Alternatively you can call [phone] to schedule      Please take a picture of your COVID-19 card and message it to me in [employer].

## 2022-05-26 NOTE — NURSING NOTE
"22 year old  Chief Complaint   Patient presents with     Physical       Blood pressure 103/66, pulse 74, temperature 97.3  F (36.3  C), temperature source Skin, resp. rate 12, height 1.95 m (6' 4.77\"), weight 90.3 kg (199 lb), SpO2 98 %. Body mass index is 23.74 kg/m .  Patient Active Problem List   Diagnosis     Decreased exercise tolerance     Normocytic anemia     Arthralgia of both knees       Wt Readings from Last 2 Encounters:   05/26/22 90.3 kg (199 lb)   08/27/21 85.7 kg (189 lb)     BP Readings from Last 3 Encounters:   05/26/22 103/66   08/27/21 108/67   12/23/20 94/59         Current Outpatient Medications   Medication     Misc Natural Products (T-RELIEF CBD+13 SL)     dexmethylphenidate (FOCALIN XR) 10 MG 24 hr capsule     No current facility-administered medications for this visit.       Social History     Tobacco Use     Smoking status: Never Smoker     Smokeless tobacco: Never Used   Vaping Use     Vaping Use: Some days     Substances: Nicotine, Flavoring     Devices: Pre-filled or refillable cartridge   Substance Use Topics     Alcohol use: Yes     Drug use: Yes     Types: Marijuana       Health Maintenance Due   Topic Date Due     ADVANCE CARE PLANNING  Never done     HEPATITIS C SCREENING  Never done     PREVENTIVE CARE VISIT  09/04/2021     COVID-19 Vaccine (3 - Booster for Moderna series) 10/17/2021       No results found for: PAP      May 26, 2022 10:20 AM    "

## 2022-05-27 LAB
C TRACH DNA SPEC QL NAA+PROBE: NEGATIVE
HCV AB SERPL QL IA: NONREACTIVE
N GONORRHOEA DNA SPEC QL NAA+PROBE: NEGATIVE

## 2022-08-20 NOTE — PROGRESS NOTES
ASSESSMENT AND PLAN:     (M25.571,  G89.29) Chronic pain of right ankle  (primary encounter diagnosis)  Comment: Grade 2 ankle sprain after inversion injury on right 2 months ago.  Still has occasional swelling and discomfort with extreme plantarflexion.   Check x-ray.  Leaving for [place] in a little over a week for study abroad semester.  If x-ray fine, okay to leave for trip.  If still having pain when he returns, will send to sports med  Continue home ankle strengthening exercises.   Plan: X-ray rt ankle G/E 3 views*          Kevon See MD    [facility]  08/22/2022, 6:33 PM      SUBJECTIVE:   Leonel is a 22 year old male who presents to clinic today for a return visit.    # Right /Ankle Pain:  Duration: since 6/15/22  Injury?/ Inciting event?: rolled his right ankle during training for summer camp  - swelled up initially, no bruising  Location: lateral malleolus  Swelling?/ Bruising? Yes to swelling, no to bruising  Able to bear weight? Yes, is able to run a little on it without hurting  - if he does too much it swells up again and hurts  Limited motion? No but hurts with extreme plantarflexoin  Weakness?/ Instability? No, no weakness with calf raises  Imaging? no  Treatment? None, just worse an ankle-high boot during camp      Patient Active Problem List   Diagnosis     Normocytic anemia     Current Outpatient Medications   Medication     Misc Natural Products (T-RELIEF CBD+13 SL)     No current facility-administered medications for this visit.       I have reviewed the patient's relevant past medical history.     OBJECTIVE:   /68 (BP Location: Right arm, Patient Position: Sitting, Cuff Size: Adult Regular)   Pulse 75   Temp 98  F (36.7  C) (Skin)   Resp 12   Wt 92.9 kg (204 lb 12 oz)   SpO2 97%   BMI 24.42 kg/m      Constitutional: well-appearing, appears stated age  Eyes: conjunctivae without erythema, sclera anicteric.   Skin: no rashes, lesions, or wounds  Psych: affect  is full and appropriate, speech is fluent and non-pressured    Right Foot/Ankle:   Swelling: yes, anterior side of lateral malleolus ; Bruising: no  ROM: Full in dorsiflexion, plantarflexion, inversion, eversion  - some lateral malleolus discomfort with passive full plantarflexion with inversion    Strength: 5/5 in dorsiflexion/ plantarflexion/ inversion/ eversion  - no pain with resistance in this movements.     Bony tenderness: medial malleolus - no; lateral malleolus - no.  Navicular - no 5th metatarsal no.  Ligaments: No tenderness over ATFL/CFL/ deltoid ligament/ syndesmosis/Lisfranc ligament complex  Tendons: achilles complex intact - Yes; tender - no  Maneuvers: Negative anterior drawer;   Talar tilt is positive for slightly more laxity on right compared to left

## 2022-08-22 ENCOUNTER — OFFICE VISIT (OUTPATIENT)
Dept: FAMILY MEDICINE | Facility: CLINIC | Age: 22
End: 2022-08-22
Payer: COMMERCIAL

## 2022-08-22 VITALS
OXYGEN SATURATION: 97 % | RESPIRATION RATE: 12 BRPM | SYSTOLIC BLOOD PRESSURE: 106 MMHG | WEIGHT: 204.75 LBS | DIASTOLIC BLOOD PRESSURE: 68 MMHG | BODY MASS INDEX: 24.42 KG/M2 | HEART RATE: 75 BPM | TEMPERATURE: 98 F

## 2022-08-22 DIAGNOSIS — G89.29 CHRONIC PAIN OF RIGHT ANKLE: Primary | ICD-10-CM

## 2022-08-22 DIAGNOSIS — M25.571 CHRONIC PAIN OF RIGHT ANKLE: Primary | ICD-10-CM

## 2022-08-22 NOTE — NURSING NOTE
22 year old  Chief Complaint   Patient presents with     Ankle Pain     X 2 months       Blood pressure 106/68, pulse 75, temperature 98  F (36.7  C), temperature source Skin, resp. rate 12, weight 92.9 kg (204 lb 12 oz), SpO2 97 %. Body mass index is 24.42 kg/m .  Patient Active Problem List   Diagnosis     Normocytic anemia       Wt Readings from Last 2 Encounters:   08/22/22 92.9 kg (204 lb 12 oz)   05/26/22 90.3 kg (199 lb)     BP Readings from Last 3 Encounters:   08/22/22 106/68   05/26/22 103/66   08/27/21 108/67         Current Outpatient Medications   Medication     Misc Natural Products (T-RELIEF CBD+13 SL)     No current facility-administered medications for this visit.       Social History     Tobacco Use     Smoking status: Never Smoker     Smokeless tobacco: Never Used   Vaping Use     Vaping Use: Some days     Substances: Nicotine, Flavoring     Devices: Pre-filled or refillable cartridge   Substance Use Topics     Alcohol use: Yes     Drug use: Yes     Types: Marijuana       Health Maintenance Due   Topic Date Due     NICOTINE/TOBACCO CESSATION COUNSELING Q 1 YR  Never done     COVID-19 Vaccine (3 - Booster for Moderna series) 10/17/2021       No results found for: PAP      August 22, 2022 12:59 PM

## 2022-11-20 ENCOUNTER — HEALTH MAINTENANCE LETTER (OUTPATIENT)
Age: 22
End: 2022-11-20

## 2022-11-29 NOTE — TELEPHONE ENCOUNTER
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

## 2022-12-20 ENCOUNTER — OFFICE VISIT (OUTPATIENT)
Dept: OTOLARYNGOLOGY | Facility: CLINIC | Age: 22
End: 2022-12-20
Payer: COMMERCIAL

## 2022-12-20 VITALS
WEIGHT: 208 LBS | BODY MASS INDEX: 24.56 KG/M2 | HEIGHT: 77 IN | HEART RATE: 74 BPM | SYSTOLIC BLOOD PRESSURE: 97 MMHG | DIASTOLIC BLOOD PRESSURE: 53 MMHG | TEMPERATURE: 96.8 F | OXYGEN SATURATION: 99 %

## 2022-12-20 DIAGNOSIS — R49.8 VOICE STRAIN: Primary | ICD-10-CM

## 2022-12-20 DIAGNOSIS — R49.0 DYSPHONIA: ICD-10-CM

## 2022-12-20 DIAGNOSIS — R49.0 DYSPHONIA: Primary | ICD-10-CM

## 2022-12-20 DIAGNOSIS — R49.8 VOICE STRAIN: ICD-10-CM

## 2022-12-20 PROCEDURE — 99203 OFFICE O/P NEW LOW 30 MIN: CPT | Mod: 25 | Performed by: OTOLARYNGOLOGY

## 2022-12-20 PROCEDURE — 92524 BEHAVRAL QUALIT ANALYS VOICE: CPT | Mod: GN | Performed by: SPEECH-LANGUAGE PATHOLOGIST

## 2022-12-20 PROCEDURE — 31579 LARYNGOSCOPY TELESCOPIC: CPT | Performed by: OTOLARYNGOLOGY

## 2022-12-20 ASSESSMENT — PAIN SCALES - GENERAL: PAINLEVEL: NO PAIN (0)

## 2022-12-20 NOTE — PATIENT INSTRUCTIONS
Gavi Grijalva M.M. (voice), M.A., CCC/SLP  Speech-Language Pathologist  [facility] Trained Vocologist  [facility]  [email or web address] for fastest response  she/her  [email or web address]    You have been referred for functional voice therapy    Why?    Therapy is very useful in treating all voice disorders, regardless of the type of disorder.  If you have:    Muscle tension dysphonia: Your voice disorder is caused by abnormal use of the muscles of the vocal mechanism, and functional voice therapy will teach your muscles how to work properly.  A growth on your vocal folds (such as nodules, polyps, or cysts): Your lesion may be caused by inappropriate use of the muscles, and therefore can only be cured by learning techniques for better muscle use.  Or, your lesion may cause the muscles to be used incorrectly, and relearning better technique is an important part of overall treatment.  If your lesion needs to be surgically removed, learning to use good muscle technique helps ensure an optimal surgical result.  A vocal fold paralysis: Learning to use your muscles to compensate can be very helpful, and may even eliminate the need for surgery.  About muscle tension dysphonia    One of the most common voice disorders we treat is muscle tension dysphonia (MTD).  Dysphonia simply means that the sound of the voice is insufficient for its intended purpose. MTD, however, may also refer to a voice that sounds normal, but causes pain, discomfort, or fatigue to the voice user.  MTD is considered a functional disorder; that is, the muscles do not function properly, causing poor sound, discomfort, or a sensation of increased effort.    There are many possible reasons why use of the vocal mechanism becomes abnormal.  Some general causes are very common:  prolonged illness  prolonged overuse  prolonged underuse (such as after a surgery)  trauma, such as an injury, chemical exposure, or emotionally  traumatic event    These can lead to an abnormal muscle response, causing a person to use more effort while talking.  Moreover, the pushing and squeezing can be very subtle, making the individual unaware of the extra effort.  The result may or may not be a stronger voice, but it usually starts a vicious cycle where more and more effort is required.  This cycle can continue for months or even years before the individual becomes aware that his or her voice is abnormal.  Usually, the only treatment available for muscle tension dysphonia is functional therapy.      Basics of functional voice therapy    There are some general things you should know about functional voice therapy.  Functional voice therapy . . .  is also known as voice therapy or behavioral voice therapy.  should only be done after a thorough evaluation by an ENT (ear, nose, and throat) physician.  should be done with a certified speech-language pathologist who specializes in voice disorders.  includes education about the use and care of the voice and how the voice works.  uses progressive exercises taught over a series of sessions.  varies in length from several sessions to many sessions over a few months, but some relief in symptoms is almost always gained within the first 4-6 sessions.  may, in the case of emotional stress, need to be accompanied by counseling or stress management.  Functional voice therapy at the [facility]    At the [facility], your functional therapy is done under the direction of Dr. SHANIQUA Rocha or Gavi Grijalva.  After a voice evaluation, a treatment plan is discussed with you, and therapy sessions are scheduled.  The plan for therapy varies from person to person, but in general, sessions occur weekly for one hour at first.  Sessions gradually become more spaced apart as you learn more advanced techniques.  After a few sessions, you may need more time to practice and incorporate your techniques into everyday  speech.    The first few sessions generally include a thorough discussion of your vocal lifestyle - voice needs, activities, and habits.  We will teach you how to keep the voice healthy regardless of the level of voice activity.  You will also learn pertinent information about how the voice works, helping you understand the therapeutic process better.  Then, exercises are taught to keep the upper body relaxed while using the voice, and to ensure optimal breathing technique.    At this point, specific voice exercises are taught.  Certain sounds affirm that the muscles are used correctly.  You will learn exercises to achieve these sounds first.  Once these sounds are mastered, you will advance to words, sentences, and finally conversational speech.  During your therapy sessions, exercises will be tailored to your vocal strengths and weaknesses.  During therapy, you will probably find it helpful to record your therapy session on compact disc.  Recording the exercises makes it easier to practice at home.  We encourage you to bring a CD with you to therapy.    Health insurance coverage for functional voice therapy    A normal sounding voice, free from discomfort or fatigue, is considered a normal function.  If it is disordered, restoring it is considered medically necessary.  Most insurance companies recognize this, and therapy is covered under most policies.    Functional voice therapy is considered a form of speech therapy.  If your insurance policy covers rehabilitation services such as physical therapy and speech therapy, therapy for a voice disorder should be covered.  If you have any questions about coverage, or problems with coverage for your voice treatment, please talk to Dr. Rocha or Ms. Grijalva.  They can offer you strategies for getting them resolved.    For more information on this topic, visit our web site at [email or web address]      Muscle Tension Dysphonia    One of the most common voice  disorders we treat at the [facility] is muscle tension dysphonia (MTD).  The root word phon means  sound .  Phonation refers to the sound made by the voice.  The term dysphonia means there is something wrong with the voice.  However, muscle tension dysphonia can also refer to a voice that sounds normal but causes pain, discomfort, or fatigue to the voice user.  MTD is known as a functional disorder; that is, there is nothing structurally wrong with the voice.  Rather, the muscles do not function properly, which causes poor sound, discomfort, or increased effort.    Symptoms of MTD    Different individuals may have very different symptoms of MTD.  Possible voice characteristics include     rough, hoarse, gravelly, raspy     weak, breathy, airy, leaky, backward, hollow     strained, pressed, squeezed, tight, tense, choked, effortful     jerky, shaky, halting,     suddenly cutting out, squeezing shut, breaking off, changing pitch, or fading away     giving out gradually, or becoming weaker or more tense as voice use continues     excessively high or low pitch     inability to produce a loud or clear voice     inability to sing notes that used to be easy    Possible sensations of MTD include     pain or discomfort anywhere in the throat  area associated with voice use     a tight choking sensation with voice use     fatigue or effort that increases with voice use     some area of the neck becoming tender to the touch     feeling a need to clear the throat frequently     a feeling of a lump in the throat    Causes of Muscle Tension Dysphonia  There are many specific, individual reasons why use of the vocal mechanism becomes abnormal.  Some common causes are prolonged illness, prolonged voice overuse, prolonged voice underuse (such as after a surgery), and trauma, such as an injury, chemical exposure, or an emotionally traumatic event.  These lead to an abnormal vocal response, causing the individual to compensate  by using extra effort while talking.    The onset of MTD can be very subtle.  The individual is usually unaware of the extra effort, but this extra effort typically recruits muscles that are not part of the larynx (also known as the voice box).  The result may or may not be a stronger voice, but a vicious cycle usually starts in which more and more effort is required.  This cycle may continue for months or even years before the individual becomes aware that the voice is abnormal.    Treatment of MTD  Functional voice therapy is usually the only treatment available for MTD.  The amount of time required to complete functional therapy varies from only a few sessions to many months, but generally some relief is gained in the first 4 to 6 sessions.  In cases of emotional stress, counseling or stress management may be helpful or even necessary.    Occasionally, medical or surgical treatments may be tried.  Botox injections may be useful in severe cases.  Surgery has been tried in very few cases but is not done at the [facility].  Muscle relaxants are NOT useful for muscle tension dysphonia.  The action of these drugs is not localized to the vocal mechanism, so in order to provide enough relaxation for the vocal mechanism, the individual is often unable to function for day-to-day living.    Types of Muscle Tension Dysphonia  Muscle tension in the vocal mechanism can be exhibited in many ways.  Each individual is different, but a few common patterns are:     Anterior-posterior constriction     Hyperabduction     Hyperadduction     Pharyngeal constriction     Ventricular phonation     Vocal fold bowing (explained in another brochure)     back           r  i  g  h  t       front   A cross-section of the larynx (voice box) as seen from above.  This diagram may be helpful for visualizing the descriptions below.    Anterior-Posterior Constriction  In anterior-posterior constriction, the arytenoid cartilages bend forward  "during voice use, and/or the epiglottis bends backwards, causing the larynx to squeeze from front to back.  As effort increases, squeezing continues until the vocal folds (also called vocal cords) cannot be seen and may be unable to vibrate.  In extreme cases, especially in children, the arytenoids may actually vibrate against the epiglottis. The sound of the voice for someone with anterior-posterior constriction could range from normal to extremely squeezed and tight.  The voice may sound rough if the squeezing causes irregular vibration of the vocal folds.  Some experience a \"froggy\" sound if the arytenoids and epiglottis vibrate.  Someone with anterior-posterior constriction may complain of discomfort, increasing pain during voice use that may remain during rest, or fatigue and decline of voice quality as the voice is used more and more.    Hyperabduction  Abduction is the term for the vocal folds coming apart during breathing.  When a person has hyperabduction, the vocal folds do not sufficiently come together to produce voice.  They may appear to be pulled apart as the person phonates.  An emotional component may be present with this type of MTD.    A person with hyperabduction may have a weak, breathy, airy, very soft, or hollow voice, sometimes with breaks in phonation.  He or she may experience effort and fatigue.  Often times, functional therapy is combined with psychotherapy to treat hyperabduction.  In very severe cases, Botox injections are also a useful treatment.    Hyperadduction  In hyperadduction, the vocal folds adduct (come together) excessively tightly, restricting airflow during phonation.  The larynx may look normal in an exam, but the sound and sensation are not.    The sound of a voice with hyperadduction ranges from normal to extremely tight, pressed, squeezed, strangled, forced or effortful.  The muscle tension may be irregular, causing a stopping/starting or shaking effect.  A person with " hyperadduction may experience pain, discomfort, and effort and fatigue, usually increasing with continued voice use.  In very severe cases, Botox injections are helpful    Pharyngeal Constriction  During pharyngeal constriction, muscles of the pharynx (throat) contract excessively during voice use, making the throat very constricted.  The sound of the voice ranges from normal to very tight or squeezed.  It may be tremulous or throaty sounding.  A person with pharyngeal constriction may experience discomfort, pain, fatigue, or declining voice quality with use.  Pain usually increases with voice use but may remain during rest.  Sometimes emotional stress can provoke pharyngeal constriction.    Ventricular Phonation  This type of MTD is also called plica ventricularis, ventricular dysphonia, or false cord phonation.  The ventricular folds come together and vibrate instead of, or along with, the vocal folds.  The ventricular folds, also known as the false vocal cords, are mounds of fleshy tissue just above the vocal folds.  Though the ventricular folds are not muscular, they can be brought together and vibrated.  However, they were not meant to vibrate, so they cannot produce high pitches or loud sounds.  Pressure from the ventricular folds is usually strong enough to keep the true vocal folds from vibrating.  Most often, ventricular phonation is caused by continued voice use while true vocal folds are impaired, though sometimes extreme strain in response to a trauma is the cause.    Ventricular phonation sounds very rough and strained, sometimes inhuman, limited in pitch and volume.  One who uses ventricular phonation may experience fatigue (especially with attempts at loud voice use), pain or dryness with phonation.  However, sometimes no discomfort will be sensed at all.  In extreme cases, medical or surgical treatments may be tried to treat ventricular phonation, but only after functional therapy has failed.  In  some cases, ventricular phonation is the best alternative for persons whose true vocal folds will always be too impaired to vibrate.

## 2022-12-20 NOTE — PROGRESS NOTES
[facility]  Charlie Knight Jr., M.D., F.A.C.S.  Antonette Mcgarry M.D., M.P.H.  Clementine Law M.D.  Prisca Rocha, Ph.D., CCC-SLP  Ede Pardo, Ph.D., [facility]-SLP  Gavi Grijalva M.M. (voice), M.A., CCC-SLP  Regla Farley M.S., CCC-SLP  Colleen Mitchell M.S., CCC-SLP  SULEMA Laird (voice), M.S., CCC-SLP  [facility]  Evaluation report - IN PERSON APPOINTMENT    Clinician: Gavi Grijalva M.M. (voice), M.A., CCC-SLP and Regla Farley M.A., CCC-SLP  Seen in conjunction with: Dr. Clementine Law  Referring physician:  Self  Patient: Leonel Miller  Date of Visit: 12/20/2022    HISTORY    Chief complaint: Leonel Retana is a 22 year old presenting today for evaluation of voice and throat issues.      Salient history: He has a history significant as a student studying classical singing at [employer] in [place].    We were joined by: his mother  CURRENT SYMPTOMS INCLUDE  VOICE:     Diop - classical opera - baritone/ dramatic tenor - at school - [place], [place]    Music classical    Found throat singing - [place] for a study abroad, and independent study and did throat singing and did a lot of it while there for a month.  Every other day and then a long weekend for 2 months - felt fine but towards the end his thorat singing/ chanting was solid and getting easy, but then got difficult and painful. Couple times coughed up blood, but thought it was ok    Didn't do as much classical singing. Noticably the thorat singing was feeling good, but rapidly decline.    Next semester  - [employer]. [place].     Studying Buddism and singing. Chanting     Graduating spring 2024 - [employer]  would like him to go on with his singing. Voice training 1x/week.      Speaking voice - susceptible to mood and environment. Has heard more lou since the throat singing.     litlte more hoarse than normal, but general pliabilty of the speaking voice and  singing is used to be more bold, but now experiencing more. Has never happened before.    Mitigating: Vocal rest and lemon tea    THROAT ISSUES:     Throat:    No cough or TC    No globus    Feeling dry    SWALLOWING:     No issues    RESPIRATION:     No issues    covid 3 months ago - not bad just a fever. Vaccinated.    No major surgeries or intubations.     ADDITIONAL    Reflux:     No anti-reflux meds    Silent reflux mostly, but occasionally will have some of the meal come up. Just swallows back down.  Will lie on this left side if lying down after eating (not the right).    OTHER PERTINENT HISTORY    Complex medical history: please also refer to Dr. Law's dictation.     No past medical history on file.  No past surgical history on file.    OBJECTIVE  PATIENT REPORTED MEASURES  Patient Supplied Answers To VHI Questionnaire  No flowsheet data found.    Patient Supplied Answers To CSI Questionnaire  No flowsheet data found.    Patient Supplied Answers To EAT Questionnaire  No flowsheet data found.      PERCEPTUAL EVALUATION (CPT 97411)  POSTURE / TENSION/ PALPATION OF THE LARYNGEAL AREA:     upper body    neck and shoulders    BREATHING:     appears within normal limits and adequate     clavicular elevation on inspiration    shoulder and neck involvement    LARYNGEAL PALPATION:     firm musculature    tenderness of the thyrohyoid area    reduced thyrohyoid space    VOICE:    Voice - 6.5/10 10= best    Singing voice: 6/10 10= best    Vocal lou is intermittently present.     naturally demonstrates mild vocal lou, and will try to talk softer and higher     Lars Retana states that today is a typical voice today, with clinician observing voice quality to be characterized as:    Roughness: Mild Intermittent    Breathiness: WNL    Strain: Mild Intermittent with speech    Pitch:     F0/ Habitual Pitch: 138 Hz, 587-117  o Resonance:     Conversational speech:  backward focus of resonance    Loudness    Conversational  speech:  WNL    Projected speech:  WNL     Singing vs. Speech:  Same effort.  Tuva singing naturally requires much more tension.    GLOBAL ASSESSMENT OF DYSPHONIA: 25/100    COUGH/THROAT CLEARING:    not observed today     Throat discomfort = none while at rest.    LARYNGEAL FUNCTION STUDIES (CPT 94843)  Unable to save recording today.    LARYNGEAL EXAMINATION  Procedure: Flexible endoscopy with chip-tip technology with stroboscopy, right nostril; topical anesthesia with 3% Lidocaine and 0.25% phenylephrine was applied.   Performed by: Dr. Law  The laryngeal and pharyngeal structures were evaluated for gross appearance, mobility, function, and focal lesions / abnormalities of the associated mucosa.  Stroboscopy was warranted to evaluate closure, symmetry, and vibratory characteristics of the vocal folds.  All findings were within normal limits with the exception of the following salient features:   This exam shows the following:    Posterior commissure: Normal  Secretions: minimal    Movement:  Right Vocal Fold: Normal  Left Vocal Fold: Normal  Severe supraglottic hyperfunction with speech. Present, as expected with Tuva singing, markedly present during classical singing.    Glottic Closure: Normal  Upper Airway: Patent    Vocal Fold Findings:  Right Vocal Fold: WNL  Left Vocal Fold: WNL    Other Findings: none    The addition of stroboscopy allowed evaluation of the mucosal wave:    Amplitude: right: WNL; left: WNL     Symmetry: good symmetry.    Closure pattern: normal.     Closure plane: at glottic level.     Phase distribution: normal.    THERAPY PROBES: Improvement was elicited with use of forward resonant stimuli, coordination of respiration and phonation and use of yawn sigh      Impression: muscle tension dysphonia    The laryngeal exam was reviewed with Mr. Lars Retana, and I provided pertinent explanations, as well as written and oral information.       ASSESSMENT / PLAN  IMPRESSIONS: Leonel Retana  is a 22 year old [facility] [employer] and [employer] pursuing education in voice performance, presenting today with Irritable Larynx Syndrome (ILS) (J38.7), Globus Sensation (R09.89), Throat Pain (R07.0), Laryngeal Hyperfunction (J38.7) and Dysphonia (R49.0) , as evidenced by evaluation the results of the evaluation and the laryngeal exam.    Remarkable findings included:    Perceptual evaluation demonstrated:     Roughness: Mild Intermittent    Breathiness: WNL    Strain: Mild Intermittent with speech    Pitch:     F0/ Habitual Pitch: 138 Hz, 587-117  o Resonance:     Conversational speech:  backward focus of resonance    Laryngeal exam demonstrated: severe supraglottic hyperfunction with most vocal activities    Primary complaint of patient today included: globus sensation and dysphonia  Therefore, we recommended a course of speech therapy to address these concerns.    STIMULABILITY: results of therapy probes during perceptual and laryngeal evaluation demonstrate improvement with orward resonant stimuli, coordination of respiration and phonation and use of yawn sigh    RECOMMENDATIONS:     A course of speech therapy is recommended to optimize vocal technique, improve voice quality and promote reduced discomfort, effort and fatigue.    He demonstrates a Good prognosis for improvement given adherence to therapeutic recommendations.     Positive indicators: positive response to therapy probes diagnosis is known to respond to treatment high level of comittment    Negative indicators: none    Research: This patient will need to be asked at next appt.    DURATION / FREQUENCY: 3 biweekly virtual one-hour sessions.  A total of 6-8 sessions may be necessary.    GOALS:  Patient goal:   1. To improve and maintain a healthy voice quality  2. To understand the problem and fix it as much as possible  3. To have a normal and acceptable voice quality    Long-term goal(s): In 6 months, Mr. Lars Fragosovel  will:  1. Report resolution of dysphonia, and use of optimal voice quality to meet personal, social, and professional needs, 90% of the time during a typical week of vocal activities  This treatment plan was developed with the patient who agreed with the recommendations.    TOTAL SERVICE TIME: 60 minutes  EVALUATION OF VOICE AND RESONANCE (03136)  NO CHARGE FACILITY FEE (02489)    Gavi Grijalva M.M. (voice), M.A., CCC/SLP  Speech-Language Pathologist  [facility] Trained Vocologist  [facility]  [phone]  [email or web address]  Pronouns: she/her      *this report was created in part through the use of computerized dictation software, and though reviewed following completion, some typographic errors may persist.  If there is confusion regarding any of this notes contents, please contact me for clarification

## 2022-12-20 NOTE — LETTER
2022       RE: Leonel Retana  [address]  [facility] [address]     Dear Colleague,    Thank you for referring your patient, Leonel Retana, to the [facility] EAR NOSE AND THROAT CLINIC [place] [facility]. Please see a copy of my visit note below.        [facility]   at the [facility]   Otolaryngology Clinic     Patient: Leonel Retana    MRN: [id number]    : 2000    Age/Gender: 22 year old male  Date of Service: 2022  Rendering Provider:   Clementine Law MD     Referring Provider   PCP: Kevon See  Referring Physician: Amita Talbert MD  No address on file  Reason for Consultation   Dysphonia  History   HISTORY OF PRESENT ILLNESS: Mr. Lars Retana is a 22 year old male who presents to us today with dysphonia.      Of note, professional voice user    he presents today for evaluation. 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 [place]  - has been working on throat singing every other day with Tibetan monks while studying abroad in [facility]  - did not do as much classical singing while abroad  - throat was feeling good overall but then rapid decline with pain and discomfort  - studying at [employer] in [place] next semester  - speaking voice today 6.5/10  - singing voice is 6.6/10  - hoarse  - vocal lou intermittently    Dysphagia  - denies    Dyspnea  - denies  - had COVID with fever 3 months ago, no other symptoms    Throat clearing/cough  - denies    GERD/LPRD   - feels food come back up to his mouth several times a month  - eats very large meals  - not on any medications    History also obtained from mother today.    PAST MEDICAL HISTORY: No past medical history on file.    PAST SURGICAL HISTORY: No past surgical history on file.    CURRENT MEDICATIONS:   Current Outpatient Medications:       Misc Natural Products (T-RELIEF CBD+13 SL), , Disp: , Rfl:     ALLERGIES: Sulfa drugs    SOCIAL HISTORY:    Social History     Socioeconomic History     Marital status: Single     Spouse name: Not on file     Number of children: Not on file     Years of education: Not on file     Highest education level: Not on file   Occupational History     Not on file   Tobacco Use     Smoking status: Never     Smokeless tobacco: Never   Vaping Use     Vaping Use: Some days     Substances: Nicotine, Flavoring     Devices: Pre-filled or refillable cartridge   Substance and Sexual Activity     Alcohol use: Yes     Drug use: Yes     Types: Marijuana     Sexual activity: Yes     Partners: Female     Birth control/protection: Condom     Comment: exclusive with female partner   Other Topics Concern     Not on file   Social History Narrative    Goes to [employer], [employer] as of 05/2022    Studying Fall 2022 in [place], Spring 2023 in [place]    Thinking of majoring in Hoah[employer]ism    Think about teaching or law school     Social Determinants of Health     Financial Resource Strain: Not on file   Food Insecurity: Not on file   Transportation Needs: Not on file   Physical Activity: Not on file   Stress: Not on file   Social Connections: Not on file   Intimate Partner Violence: Not on file   Housing Stability: Not on file         FAMILY HISTORY:   Family History   Problem Relation Age of Onset     Hypertension Mother      No Known Problems Father      No Known Problems Brother      Asthma Maternal Grandmother      Atrial fibrillation Maternal Grandmother      Cerebrovascular Disease Maternal Grandmother 65     Cerebrovascular Disease Paternal Grandmother 75     Cancer No family hx of      Heart Disease No family hx of      Diabetes No family hx of      Non-contributory for problems with anesthesia    REVIEW OF SYSTEMS:   The patient was asked a 14 point review of systems regarding constitutional symptoms, eye symptoms, ears,  nose, mouth, throat symptoms, cardiovascular symptoms, respiratory symptoms, gastrointestinal symptoms, genitourinary symptoms, musculoskeletal symptoms, integumentary symptoms, neurological symptoms, psychiatric symptoms, endocrine symptoms, hematologic/lymphatic symptoms, and allergic/ immunologic symptoms.   The pertinent factors have been included in the HPI and below.  Patient Supplied Answers to Review of Systems  No flowsheet data found.    Physical Examination   The patient underwent a physical examination as described below. The pertinent positive and negative findings are summarized after the description of the examination.  Constitutional: The patient's developmental and nutritional status was assessed. The patient's voice quality was assessed.  Head and Face: The head and face were inspected for deformities. The sinuses were palpated. The salivary glands were palpated. Facial muscle strength was assessed bilaterally.  Eyes: Extraocular movements and primary gaze alignment were assessed.  Ears, Nose, Mouth and Throat: The ears and nose were examined for deformities. The nasal septum, mucosa, and turbinates were inspected by anterior rhinoscopy. The lips, teeth, and gums were examined for abnormalities. The oral mucosa, tongue, palate, tonsils, lateral and posterior pharynx were inspected for the presence of asymmetry or mucosal lesions.    Neck: The tracheal position was noted, and the neck mass palpated to determine if there were any asymmetries, abnormal neck masses, thyromegally, or thyroid nodules.  Respiratory: The nature of the breathing and chest expansion/symmetry was observed.  Cardiovascular: The patient was examined to determine the presence of any edema or jugular venous distension.  Abdomen: The contour of the abdomen was noted.  Lymphatic: The patient was examined for infraclavicular lymphadenopathy.  Musculoskeletal: The patient was inspected for the presence of skeletal  deformities.  Extremities: The extremities were examined for any clubbing or cyanosis.  Skin: The skin was examined for inflammatory or neoplastic conditions.  Neurologic: The patient's orientation, mood, and affect were noted. The cranial nerve  functions were examined.  Other pertinent positive and negative findings on physical examination:      OC/OP: no lesions, uvula midline, soft palate elevates symmetrically   Neck: no lesions, no TH tenderness to palpation     All other physical examination findings were within normal limits and noncontributory.  Procedures   Video Laryngoscopy with Stroboscopy (CPT 08192)    Preoperative Diagnosis:  Dysphonia and throat symptoms  Postoperative Diagnosis: Dysphonia and throat symptoms  Indication:  Patient has new or persistent dysphonia and throat symptoms.  This requires evaluation by stroboscopy to fully delineate the laryngeal functioning; especially mucosal wave assessment, ultrasharp visualization of lesions on the vocal folds, and overall functioning of the larynx.  Details of Procedure: A 70 degree rigid telescopic laryngoscope or a distal chip flexible scope was used. The lighting was obtained via a light cable connected to a stroboscopic unit. The telescope was inserted either transorally or transnasally until the vocal folds could be visualized. The patient was instructed to sustain the vowel  ee  at a comfortable pitch and loudness as the voice was monitored by a microphone connected to a fundamental frequency tracker. This circuit tracked vocal periodicity, allowing the light to flash in synchrony with the glottal cycles. A setting on the stroboscope was set to change the phase of light strobing with relation to the vocal fundamental frequency, producing an image of 1 to 2 glottal cycles every second. The video images were recorded for analysis. Use of the variable speed, slow and stop scan allowed careful study of pertinent segments of laryngeal function to  increase accuracy of clinical assessments of function and dysfunction.  In particular, features of glottal closure, mucosal wave on the vocal fold cover and laryngeal symmetry were analyzed. Lastly, the patient was asked to phonate speech samples and auditory/perceptual evaluation of voice and resonance were performed.  The vocal quality was carefully evaluated for hoarseness, breathiness, loudness, phrase length and intelligibility to determine the source of dysphonia.    Findings:      B. LARYNGOVIDEOSTROBOSCOPY   Anatomic/Physiological Deviations:  RNC, significant supraglottic hyperfunction  Mucosal wave: Right:  No restriction     Left: No restriction  Bilateral Vocal Fold Vibration: Symmetric  Vocal Process: Right: No restriction    Left:  No restriction  Vocal Fold closure: Complete glottal closure    Complication(s): None  Disposition: Patient tolerated the procedure well          Review of Relevant Clinical Data   I personally reviewed:  Notes:    Radiology:    Pathology:    Procedures:    Labs:  No results found for: TSH  Lab Results   Component Value Date    .0 09/04/2020    CO2 28.0 09/04/2020    BUN 12.0 09/04/2020     Lab Results   Component Value Date    WBC 6.2 05/26/2022    HGB 13.3 05/26/2022    HCT 40.5 05/26/2022    MCV 98 05/26/2022     05/26/2022     No results found for: PT, PTT, INR  No results found for: ISMAEL  No components found for: RHEUMATOIDFACTOR,  RF  No results found for: CRP  No components found for: CKTOT, URICACID  No components found for: C3, C4, DSDNAAB, NDNAABIFA  No results found for: MPOAB    Patient reported Quality of Life (QOL) Measures   Patient Supplied Answers To VHI Questionnaire  No flowsheet data found.      Patient Supplied Answers To EAT Questionnaire  No flowsheet data found.      Patient Supplied Answers To CSI Questionnaire  No flowsheet data found.      Patient Supplied Answers to Dyspnea Index Questionnaire:  No flowsheet data found.    Impression &  Plan     IMPRESSION: Mr. Lars Retana is a 22 year old male who is being seen for the followin. 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 Lars Retana. If you have any questions, please do not hesitate to contact me.    Scribe Disclosure:  I, Gaston Manning, am serving as a scribe to document services personally performed by Clementine Law MD based on data collection and the provider's statements to me.     Clementine Law MD    Laryngology    [facility]  Department of  Otolaryngology - Head and Neck Surgery  Clinics & [place] [address]  Appointment line: [phone]  Fax: [phone]  [email or web address]

## 2022-12-20 NOTE — PATIENT INSTRUCTIONS
1.  You were seen in the ENT Clinic today by . If you have any questions or concerns after your appointment, please call [phone]. Press option #1 for scheduling related needs. Press option #3 for Nurse advice.    2.   has recommended  the following:   - voice therapy    3.  Plan is to return to clinic as needed after therapy      Nisreen Russo LPN  [phone]  [facility] Otolaryngology

## 2022-12-21 ENCOUNTER — VIRTUAL VISIT (OUTPATIENT)
Dept: OTOLARYNGOLOGY | Facility: CLINIC | Age: 22
End: 2022-12-21
Payer: COMMERCIAL

## 2022-12-21 DIAGNOSIS — R49.0 DYSPHONIA: ICD-10-CM

## 2022-12-21 DIAGNOSIS — R49.8 VOICE STRAIN: Primary | ICD-10-CM

## 2022-12-21 PROCEDURE — 92507 TX SP LANG VOICE COMM INDIV: CPT | Mod: GN | Performed by: SPEECH-LANGUAGE PATHOLOGIST

## 2022-12-21 NOTE — LETTER
"12/21/2022       RE: Leonel Retana  [address]  [facility] [address]     Dear Colleague,    Thank you for referring your patient, Leonel Retana, to the [facility] VOICE CLINIC [place] at [facility]. Please see a copy of my visit note below.    Leonel Retana is a 22 year old male who is being cared for via a billable virtual visit.        The patient has been notified and verbally consented to the following statements:     This video visit will be conducted between you and your provider.    If during the course of the call the provider feels a video visit is not appropriate, you will not be charged for this service.    Provider has received verbal consent for billable virtual visit from the patient? Yes    Preferred method for receiving information: [employer]     Call initiated at: 9 AM  Platform used to conduct today's virtual appointment: AM Well Video  Location of provider: Residence  Location of patient: [facility]  THERAPY NOTE (CPT 99344)  Patient: Leonel Miller  Date of Service: 12/21/2022  Referring physician: Dr. 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    Remarkable findings included:    Perceptual evaluation demonstrated:   ? Roughness: Mild Intermittent  ? Breathiness: WNL  ? Strain: Mild Intermittent with speech  ? Pitch:   ? F0/ Habitual Pitch: 138 Hz, 587-117  ? Resonance:                           Conversational speech:  backward focus of resonance\"    SUBJECTIVE:  Since the " last appointment, Mr. Lars Retana reports the following:     Overall he reports that symptoms are stable since his evaluation yesterday    OBJECTIVE:  Mr. Lars Retana presents today with the following:  VOICE:    Voice - 6.5/10 10= best    Singing voice: 6/10 10= best    Vocal lou is intermittently present.     naturally demonstrates mild vocal lou, and will try to talk softer and higher   ? Lars Retana states that today is a typical voice today, with clinician observing voice quality to be characterized as:  ? Roughness: Mild Intermittent  ? Breathiness: WNL  ? Strain: Mild Intermittent with speech  ? Pitch:   ? F0/ Habitual Pitch: 138 Hz, 587-117  ? Resonance:   - Conversational speech:  backward focus of resonance      PATIENT REPORTED MEASURES:  Patient Supplied Answers To SLP QOL Questionnaire  No flowsheet data found.  Speech follow up as discussed with patient:  No flowsheet data found.    THERAPEUTIC ACTIVITIES  Exercises and techniques for optimal vocal hygiene including:    Systemic hydration, including strategies for increasing daily water intake    Topical hydration - Gargling (saline and plain water), saline nasal irrigation, humidification, steam, guaifenesin to reduce the thickened secretions / laryngeal irritation.    Management of laryngopharyngeal reflux disease (LPRD)    Dietary alterations which may reduce liklihood of reflux events    Avoiding eating or drinking within 2-3 hours of bedtime    Raising the headboard of the bed by 3-4 inches    Avoiding eating and drinking immediately prior to rigorous activity    Proper timing and use of acid reflux medication discussed in general context with recommendation of follow-up with pharmacist for detailed instructions    Semi-Occluded Vocal Tract (SOVT) exercises instructed to reduce laryngeal tension, promote vocal fold pliability, and coordinate respiration and phonation    Straw phonation with water resistance was found to be most facilitating  "    Sustained phonation, and voice vs. voiceless productions used to promote easy voicing and raise awareness of laryngeal tension    Ascending and descending glides utilized to promote vocal fold pliability    \"Messa di voce\", gradual crescendo and decrescendo to vary medial compression was also utilized to promote vocal fold pliability.    Instructed on the benefits of using these exercises for improved coordination of breath flow with phonation and tissue mobilization.    Instructed on the importance of using these exercises as a warm-up / cool down,  and to re-calibrate the voice throughout the day.    Counseling and Education:    Asked many questions about the nature of his symptoms, and I answered all of these thoroughly.    A revised regimen for home practice was instructed.    I provided an AVS of today's therapeutic activities to facilitate practice.    ASSESSMENT/PLAN  PROGRESS TOWARD LONG TERM GOALS:   Adequate progress; too early for objective measures    IMPRESSIONS: Irritable Larynx Syndrome (ILS) (J38.7), Globus Sensation (R09.89), Throat Pain (R07.0), Laryngeal Hyperfunction (J38.7) and Dysphonia (R49.0). Mr. Lars Retana demonstrated good learning today of therapeutic activities to optimize his singing voice quality. I will    PLAN: I will see Mr. Lars Retana as soon as we are able to be rescheduled.  For practice goals see AVS.     TOTAL SERVICE TIME:   Call Initiated at: 9 AM  Call Ended at: 10 AM           CPT Billing Codes:   TREATMENT (59530)  NO CHARGE FACILITY FEE (07009)    Gavi Grijalva M.M. (voice) MFRANCISCO J., CCC/SLP  Speech-Language Pathologist  [facility] Trained Vocologist  [facility]  [phone]  [email or web address]  Pronouns: she/her      *this report was created in part through the use of computerized dictation software, and though reviewed following completion, some typographic errors may persist.  If there is confusion regarding any of this notes contents, please " contact me for clarification      Again, thank you for allowing me to participate in the care of your patient.      Sincerely,    Gavi Grijalva, SLP

## 2022-12-22 ENCOUNTER — PRE VISIT (OUTPATIENT)
Dept: OTOLARYNGOLOGY | Facility: CLINIC | Age: 22
End: 2022-12-22

## 2023-01-02 NOTE — PROGRESS NOTES
ASSESSMENT AND PLAN:     (R68.82) Low libido  (primary encounter diagnosis)  Comment: Chronic, stable.  Intermittently decreased libido. Reassured Leonel that this is more likely to be related to life stressors and activities than testosterone. He would really like the additional reassurance of a testosterone level. He's aware his insurance plan might not cover this. Ordered the test for him to return when fasting and early morning.  Looked up supplements he's planning to take in the Natural Medicines database. Longjack appears to be safe with long term use other than concern over heavy metal contamination in many supplements, so I urged caution when selecting a  as these are unregulated products. No information available on safety of Fadogia aggrestis.   Plan: Testosterone total          (Z11.6) Screening examination for malaria  Comment: No symptoms to suggest malaria. Incubation period would be up in another 2 weeks. If he doesn't develop fever, fatigue/malaise by then, no further evaluation planned.   No GI symptoms to suggest a parasite, no evaluation planned.   Plan:         Kevon See MD   [facility]  01/06/2023, 6:17 PM      SUBJECTIVE:   Leonel is a 22 year old male who presents to clinic today for a return visit.    - was in [place] from September to December 17th for study abroad  -  recommended he get checked for malaria and parasites when he returns  - had COVID while there  - had a GI illness (vomiting and diarrhea for 1 day)  - had an episode of severe abdominal pressure/pain for 2 days in late October  - took malaria prophylaxis initially but didn't for last month he was there, including at lower elevations in [facility]  - feels well, no fevers    - periodically over the years will have months of essentially zero sex drive  - tried Nigerian Longjack (Eurycoma Longifolia) and this helped a lot with his sex drive  - also considering Fadogia  "aggrestis   - wondering if this is a testosterone problem  - hasn't noticed changes in testicular size or body hair distribution      Patient Active Problem List   Diagnosis     Normocytic anemia     Current Outpatient Medications   Medication     Misc Natural Products (T-RELIEF CBD+13 SL)     No current facility-administered medications for this visit.       I have reviewed the patient's relevant past medical history.     OBJECTIVE:   /66 (BP Location: Right arm, Patient Position: Sitting, Cuff Size: Adult Regular)   Pulse 63   Temp 97.4  F (36.3  C) (Skin)   Resp 16   Ht 1.956 m (6' 5\")   Wt 94.9 kg (209 lb 1.9 oz)   SpO2 97%   BMI 24.80 kg/m      Constitutional: well-appearing, appears stated age  Eyes: conjunctivae without erythema, sclera anicteric.   Skin: no rashes, lesions, or wounds  Psych: affect is full and appropriate, speech is fluent and non-pressured  "

## 2023-01-02 NOTE — PROGRESS NOTES
"Leonel Retana is a 22 year old male who is being cared for via a billable virtual visit.        The patient has been notified and verbally consented to the following statements:     This video visit will be conducted between you and your provider.    If during the course of the call the provider feels a video visit is not appropriate, you will not be charged for this service.    Provider has received verbal consent for billable virtual visit from the patient? Yes    Preferred method for receiving information: Fl[place]art     Call initiated at: 9 AM  Platform used to conduct today's virtual appointment: AM Well Video  Location of provider: Residence  Location of patient: [facility]  THERAPY NOTE (CPT 08956)  Patient: Leonel Miller  Date of Service: 12/21/2022  Referring physician: Dr. Clementine Law  Impressions from most recent evaluation (12/21/22):  \"IMPRESSIONS: Leonel Retana is a 22 year old [facility] [employer] pursuing education in voice performance, presenting today with Irritable Larynx Syndrome (ILS) (J38.7), Globus Sensation (R09.89), Throat Pain (R07.0), Laryngeal Hyperfunction (J38.7) and Dysphonia (R49.0) , as evidenced by evaluation the results of the evaluation and the laryngeal exam.    Remarkable findings included:    Perceptual evaluation demonstrated:   ? Roughness: Mild Intermittent  ? Breathiness: WNL  ? Strain: Mild Intermittent with speech  ? Pitch:   ? F0/ Habitual Pitch: 138 Hz, 587-117  ? Resonance:                           Conversational speech:  backward focus of resonance\"    SUBJECTIVE:  Since the last appointment, Mr. Lars Retana reports the following:     Overall he reports that symptoms are stable since his evaluation yesterday    OBJECTIVE:  Mr. Lars Retana presents today with the following:  VOICE:    Voice - 6.5/10 10= best    Singing voice: 6/10 10= best    Vocal lou is intermittently present.     naturally " "demonstrates mild vocal lou, and will try to talk softer and higher   ? Lars Retana states that today is a typical voice today, with clinician observing voice quality to be characterized as:  ? Roughness: Mild Intermittent  ? Breathiness: WNL  ? Strain: Mild Intermittent with speech  ? Pitch:   ? F0/ Habitual Pitch: 138 Hz, 587-117  ? Resonance:   - Conversational speech:  backward focus of resonance      PATIENT REPORTED MEASURES:  Patient Supplied Answers To SLP QOL Questionnaire  No flowsheet data found.  Speech follow up as discussed with patient:  No flowsheet data found.    THERAPEUTIC ACTIVITIES  Exercises and techniques for optimal vocal hygiene including:    Systemic hydration, including strategies for increasing daily water intake    Topical hydration - Gargling (saline and plain water), saline nasal irrigation, humidification, steam, guaifenesin to reduce the thickened secretions / laryngeal irritation.    Management of laryngopharyngeal reflux disease (LPRD)    Dietary alterations which may reduce liklihood of reflux events    Avoiding eating or drinking within 2-3 hours of bedtime    Raising the headboard of the bed by 3-4 inches    Avoiding eating and drinking immediately prior to rigorous activity    Proper timing and use of acid reflux medication discussed in general context with recommendation of follow-up with pharmacist for detailed instructions    Semi-Occluded Vocal Tract (SOVT) exercises instructed to reduce laryngeal tension, promote vocal fold pliability, and coordinate respiration and phonation    Straw phonation with water resistance was found to be most facilitating     Sustained phonation, and voice vs. voiceless productions used to promote easy voicing and raise awareness of laryngeal tension    Ascending and descending glides utilized to promote vocal fold pliability    \"Messa di voce\", gradual crescendo and decrescendo to vary medial compression was also utilized to promote vocal fold " pliability.    Instructed on the benefits of using these exercises for improved coordination of breath flow with phonation and tissue mobilization.    Instructed on the importance of using these exercises as a warm-up / cool down,  and to re-calibrate the voice throughout the day.    Counseling and Education:    Asked many questions about the nature of his symptoms, and I answered all of these thoroughly.    A revised regimen for home practice was instructed.    I provided an AVS of today's therapeutic activities to facilitate practice.    ASSESSMENT/PLAN  PROGRESS TOWARD LONG TERM GOALS:   Adequate progress; too early for objective measures    IMPRESSIONS: Irritable Larynx Syndrome (ILS) (J38.7), Globus Sensation (R09.89), Throat Pain (R07.0), Laryngeal Hyperfunction (J38.7) and Dysphonia (R49.0). Mr. Lars Retana demonstrated good learning today of therapeutic activities to optimize his singing voice quality. I will    PLAN: I will see Mr. Lars Retana as soon as we are able to be rescheduled.  For practice goals see AVS.     TOTAL SERVICE TIME:   Call Initiated at: 9 AM  Call Ended at: 10 AM           CPT Billing Codes:   TREATMENT (67345)  NO CHARGE FACILITY FEE (06689)    Gavi Grijalva M.M. (voice), MDaysiA., CCC/SLP  Speech-Language Pathologist  [facility] Trained Vocologist  [facility]  [phone]  [email or web address]  Pronouns: she/her      *this report was created in part through the use of computerized dictation software, and though reviewed following completion, some typographic errors may persist.  If there is confusion regarding any of this notes contents, please contact me for clarification

## 2023-01-06 ENCOUNTER — OFFICE VISIT (OUTPATIENT)
Dept: FAMILY MEDICINE | Facility: CLINIC | Age: 23
End: 2023-01-06
Payer: COMMERCIAL

## 2023-01-06 VITALS
HEIGHT: 77 IN | RESPIRATION RATE: 16 BRPM | DIASTOLIC BLOOD PRESSURE: 66 MMHG | HEART RATE: 63 BPM | WEIGHT: 209.12 LBS | OXYGEN SATURATION: 97 % | BODY MASS INDEX: 24.69 KG/M2 | SYSTOLIC BLOOD PRESSURE: 115 MMHG | TEMPERATURE: 97.4 F

## 2023-01-06 DIAGNOSIS — R68.82 LOW LIBIDO: Primary | ICD-10-CM

## 2023-01-06 DIAGNOSIS — Z11.6: ICD-10-CM

## 2023-01-06 NOTE — NURSING NOTE
"22 year old  Chief Complaint   Patient presents with     Consult     Concerns of parasitic infection following trip to [facility]. Individuals who he traveled recommended he come in.       Blood pressure 115/66, pulse 63, temperature 97.4  F (36.3  C), temperature source Skin, resp. rate 16, height 1.956 m (6' 5\"), weight 94.9 kg (209 lb 1.9 oz), SpO2 97 %. Body mass index is 24.8 kg/m .  Patient Active Problem List   Diagnosis     Normocytic anemia       Wt Readings from Last 2 Encounters:   01/06/23 94.9 kg (209 lb 1.9 oz)   12/20/22 94.3 kg (208 lb)     BP Readings from Last 3 Encounters:   01/06/23 115/66   12/20/22 97/53   08/22/22 106/68         Current Outpatient Medications   Medication     Misc Natural Products (T-RELIEF CBD+13 SL)     No current facility-administered medications for this visit.       Social History     Tobacco Use     Smoking status: Never     Smokeless tobacco: Never   Vaping Use     Vaping Use: Former     Substances: Nicotine, Flavoring     Devices: Pre-filled or refillable cartridge   Substance Use Topics     Alcohol use: Yes     Drug use: Yes     Types: Marijuana       Health Maintenance Due   Topic Date Due     ADVANCE CARE PLANNING  Never done       No results found for: PAP      January 6, 2023 10:39 AM    "

## 2023-06-27 NOTE — PROGRESS NOTES
[facility]   at the [facility]   Otolaryngology Clinic     Patient: Leonel Retana    MRN: [id number]    : 2000    Age/Gender: 22 year old male  Date of Service: 2022  Rendering Provider:   Clementine Law MD     Referring Provider   PCP: Kevon See  Referring Physician: Referred MD Nitish  No address on file  Reason for Consultation   Dysphonia  History   HISTORY OF PRESENT ILLNESS: Mr. Lars Retana is a 22 year old male who presents to us today with dysphonia.      Of note, professional voice user    he presents today for evaluation. 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 [place]  - has been working on throat singing every other day with Tibetan monks while studying abroad in [place]  - did not do as much classical singing while abroad  - throat was feeling good overall but then rapid decline with pain and discomfort  - studying at [employer] in [place] next semester  - speaking voice today 6.5/10  - singing voice is 6.6/10  - hoarse  - vocal lou intermittently    Dysphagia  - denies    Dyspnea  - denies  - had COVID with fever 3 months ago, no other symptoms    Throat clearing/cough  - denies    GERD/LPRD   - feels food come back up to his mouth several times a month  - eats very large meals  - not on any medications    History also obtained from mother today.    PAST MEDICAL HISTORY: No past medical history on file.    PAST SURGICAL HISTORY: No past surgical history on file.    CURRENT MEDICATIONS:   Current Outpatient Medications:      Misc Natural Products (T-RELIEF CBD+13 SL), , Disp: , Rfl:     ALLERGIES: Sulfa drugs    SOCIAL HISTORY:    Social History     Socioeconomic History     Marital status: Single     Spouse name: Not on file     Number of children: Not on file     Years of education: Not on file     Highest education level: Not on file   Occupational History      Not on file   Tobacco Use     Smoking status: Never     Smokeless tobacco: Never   Vaping Use     Vaping Use: Some days     Substances: Nicotine, Flavoring     Devices: Pre-filled or refillable cartridge   Substance and Sexual Activity     Alcohol use: Yes     Drug use: Yes     Types: Marijuana     Sexual activity: Yes     Partners: Female     Birth control/protection: Condom     Comment: exclusive with female partner   Other Topics Concern     Not on file   Social History Narrative    Goes to [employer], rising Theo as of 05/2022    Studying Fall 2022 in [employer], Spring 2023 in [place]    Thinking of majoring in Voodoo    Think about teaching or law school     Social Determinants of Health     Financial Resource Strain: Not on file   Food Insecurity: Not on file   Transportation Needs: Not on file   Physical Activity: Not on file   Stress: Not on file   Social Connections: Not on file   Intimate Partner Violence: Not on file   Housing Stability: Not on file         FAMILY HISTORY:   Family History   Problem Relation Age of Onset     Hypertension Mother      No Known Problems Father      No Known Problems Brother      Asthma Maternal Grandmother      Atrial fibrillation Maternal Grandmother      Cerebrovascular Disease Maternal Grandmother 65     Cerebrovascular Disease Paternal Grandmother 75     Cancer No family hx of      Heart Disease No family hx of      Diabetes No family hx of      Non-contributory for problems with anesthesia    REVIEW OF SYSTEMS:   The patient was asked a 14 point review of systems regarding constitutional symptoms, eye symptoms, ears, nose, mouth, throat symptoms, cardiovascular symptoms, respiratory symptoms, gastrointestinal symptoms, genitourinary symptoms, musculoskeletal symptoms, integumentary symptoms, neurological symptoms, psychiatric symptoms, endocrine symptoms, hematologic/lymphatic symptoms, and allergic/ immunologic symptoms.   The pertinent factors have been  included in the HPI and below.  Patient Supplied Answers to Review of Systems  No flowsheet data found.    Physical Examination   The patient underwent a physical examination as described below. The pertinent positive and negative findings are summarized after the description of the examination.  Constitutional: The patient's developmental and nutritional status was assessed. The patient's voice quality was assessed.  Head and Face: The head and face were inspected for deformities. The sinuses were palpated. The salivary glands were palpated. Facial muscle strength was assessed bilaterally.  Eyes: Extraocular movements and primary gaze alignment were assessed.  Ears, Nose, Mouth and Throat: The ears and nose were examined for deformities. The nasal septum, mucosa, and turbinates were inspected by anterior rhinoscopy. The lips, teeth, and gums were examined for abnormalities. The oral mucosa, tongue, palate, tonsils, lateral and posterior pharynx were inspected for the presence of asymmetry or mucosal lesions.    Neck: The tracheal position was noted, and the neck mass palpated to determine if there were any asymmetries, abnormal neck masses, thyromegally, or thyroid nodules.  Respiratory: The nature of the breathing and chest expansion/symmetry was observed.  Cardiovascular: The patient was examined to determine the presence of any edema or jugular venous distension.  Abdomen: The contour of the abdomen was noted.  Lymphatic: The patient was examined for infraclavicular lymphadenopathy.  Musculoskeletal: The patient was inspected for the presence of skeletal deformities.  Extremities: The extremities were examined for any clubbing or cyanosis.  Skin: The skin was examined for inflammatory or neoplastic conditions.  Neurologic: The patient's orientation, mood, and affect were noted. The cranial nerve  functions were examined.  Other pertinent positive and negative findings on physical examination:      OC/OP: no  lesions, uvula midline, soft palate elevates symmetrically   Neck: no lesions, no TH tenderness to palpation     All other physical examination findings were within normal limits and noncontributory.  Procedures   Video Laryngoscopy with Stroboscopy (CPT 41077)    Preoperative Diagnosis:  Dysphonia and throat symptoms  Postoperative Diagnosis: Dysphonia and throat symptoms  Indication:  Patient has new or persistent dysphonia and throat symptoms.  This requires evaluation by stroboscopy to fully delineate the laryngeal functioning; especially mucosal wave assessment, ultrasharp visualization of lesions on the vocal folds, and overall functioning of the larynx.  Details of Procedure: A 70 degree rigid telescopic laryngoscope or a distal chip flexible scope was used. The lighting was obtained via a light cable connected to a stroboscopic unit. The telescope was inserted either transorally or transnasally until the vocal folds could be visualized. The patient was instructed to sustain the vowel  ee  at a comfortable pitch and loudness as the voice was monitored by a microphone connected to a fundamental frequency tracker. This circuit tracked vocal periodicity, allowing the light to flash in synchrony with the glottal cycles. A setting on the stroboscope was set to change the phase of light strobing with relation to the vocal fundamental frequency, producing an image of 1 to 2 glottal cycles every second. The video images were recorded for analysis. Use of the variable speed, slow and stop scan allowed careful study of pertinent segments of laryngeal function to increase accuracy of clinical assessments of function and dysfunction.  In particular, features of glottal closure, mucosal wave on the vocal fold cover and laryngeal symmetry were analyzed. Lastly, the patient was asked to phonate speech samples and auditory/perceptual evaluation of voice and resonance were performed.  The vocal quality was carefully evaluated  for hoarseness, breathiness, loudness, phrase length and intelligibility to determine the source of dysphonia.    Findings:      B. LARYNGOVIDEOSTROBOSCOPY   Anatomic/Physiological Deviations:  RNC, significant supraglottic hyperfunction  Mucosal wave: Right:  No restriction     Left: No restriction  Bilateral Vocal Fold Vibration: Symmetric  Vocal Process: Right: No restriction    Left:  No restriction  Vocal Fold closure: Complete glottal closure    Complication(s): None  Disposition: Patient tolerated the procedure well          Review of Relevant Clinical Data   I personally reviewed:  Notes:    Radiology:    Pathology:    Procedures:    Labs:  No results found for: TSH  Lab Results   Component Value Date    .0 2020    CO2 28.0 2020    BUN 12.0 2020     Lab Results   Component Value Date    WBC 6.2 2022    HGB 13.3 2022    HCT 40.5 2022    MCV 98 2022     2022     No results found for: PT, PTT, INR  No results found for: ISMAEL  No components found for: RHEUMATOIDFACTOR,  RF  No results found for: CRP  No components found for: CKTOT, URICACID  No components found for: C3, C4, DSDNAAB, NDNAABIFA  No results found for: MPOAB    Patient reported Quality of Life (QOL) Measures   Patient Supplied Answers To VHI Questionnaire  No flowsheet data found.      Patient Supplied Answers To EAT Questionnaire  No flowsheet data found.      Patient Supplied Answers To CSI Questionnaire  No flowsheet data found.      Patient Supplied Answers to Dyspnea Index Questionnaire:  No flowsheet data found.    Impression & Plan     IMPRESSION: Mr. Lars Retana is a 22 year old male who is being seen for the followin. 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 Lars Retana. If you have any questions, please do not hesitate to contact me.    Scribe Disclosure:  I, Gaston Manning, am serving as a scribe to document services personally performed by Clementine Law MD based on data collection and the provider's statements to me.     Clementine Law MD    Laryngology    [facility]  Department of  Otolaryngology - Head and Neck Surgery  Clinics & Surgery Center  [address] [place] [address]  Appointment line: [phone]  Fax: [phone]  [email or web address]      allergic reaction

## 2023-07-08 ENCOUNTER — HEALTH MAINTENANCE LETTER (OUTPATIENT)
Age: 23
End: 2023-07-08

## 2024-08-31 ENCOUNTER — HEALTH MAINTENANCE LETTER (OUTPATIENT)
Age: 24
End: 2024-08-31

## 2025-01-02 ENCOUNTER — OFFICE VISIT (OUTPATIENT)
Dept: FAMILY MEDICINE | Facility: CLINIC | Age: 25
End: 2025-01-02
Payer: COMMERCIAL

## 2025-01-02 VITALS
RESPIRATION RATE: 16 BRPM | OXYGEN SATURATION: 97 % | HEIGHT: 76 IN | HEART RATE: 64 BPM | DIASTOLIC BLOOD PRESSURE: 73 MMHG | BODY MASS INDEX: 26.79 KG/M2 | WEIGHT: 220 LBS | SYSTOLIC BLOOD PRESSURE: 120 MMHG | TEMPERATURE: 98.7 F

## 2025-01-02 DIAGNOSIS — R07.9 EXERTIONAL CHEST PAIN: ICD-10-CM

## 2025-01-02 DIAGNOSIS — F33.8 SEASONAL AFFECTIVE DISORDER: ICD-10-CM

## 2025-01-02 DIAGNOSIS — Z13.220 SCREENING FOR HYPERLIPIDEMIA: ICD-10-CM

## 2025-01-02 DIAGNOSIS — Z23 NEED FOR PROPHYLACTIC VACCINATION AND INOCULATION AGAINST INFLUENZA: ICD-10-CM

## 2025-01-02 DIAGNOSIS — Z23 HIGH PRIORITY FOR 2019-NCOV VACCINE: ICD-10-CM

## 2025-01-02 DIAGNOSIS — R39.11 URINARY HESITANCY: ICD-10-CM

## 2025-01-02 DIAGNOSIS — F90.2 ADHD (ATTENTION DEFICIT HYPERACTIVITY DISORDER), COMBINED TYPE: ICD-10-CM

## 2025-01-02 DIAGNOSIS — Z00.00 WELLNESS EXAMINATION: Primary | ICD-10-CM

## 2025-01-02 PROBLEM — F90.1 ATTENTION DEFICIT HYPERACTIVITY DISORDER (ADHD), PREDOMINANTLY HYPERACTIVE TYPE: Status: ACTIVE | Noted: 2025-01-02

## 2025-01-02 PROBLEM — F90.1 ATTENTION DEFICIT HYPERACTIVITY DISORDER (ADHD), PREDOMINANTLY HYPERACTIVE TYPE: Status: RESOLVED | Noted: 2025-01-02 | Resolved: 2025-01-02

## 2025-01-02 LAB
CHOLEST SERPL-MCNC: 145 MG/DL
FASTING STATUS PATIENT QL REPORTED: NO
HDLC SERPL-MCNC: 66 MG/DL
LDLC SERPL CALC-MCNC: 35 MG/DL
NONHDLC SERPL-MCNC: 79 MG/DL
PSA SERPL DL<=0.01 NG/ML-MCNC: 0.44 NG/ML
TRIGL SERPL-MCNC: 220 MG/DL

## 2025-01-02 PROCEDURE — 80061 LIPID PANEL: CPT | Performed by: FAMILY MEDICINE

## 2025-01-02 PROCEDURE — G0103 PSA SCREENING: HCPCS | Performed by: FAMILY MEDICINE

## 2025-01-02 RX ORDER — DEXMETHYLPHENIDATE HYDROCHLORIDE 10 MG/1
10 CAPSULE, EXTENDED RELEASE ORAL DAILY PRN
COMMUNITY
Start: 2024-12-05

## 2025-01-02 SDOH — HEALTH STABILITY: PHYSICAL HEALTH: ON AVERAGE, HOW MANY DAYS PER WEEK DO YOU ENGAGE IN MODERATE TO STRENUOUS EXERCISE (LIKE A BRISK WALK)?: 6 DAYS

## 2025-01-02 SDOH — HEALTH STABILITY: PHYSICAL HEALTH: ON AVERAGE, HOW MANY MINUTES DO YOU ENGAGE IN EXERCISE AT THIS LEVEL?: 150+ MIN

## 2025-01-02 ASSESSMENT — SOCIAL DETERMINANTS OF HEALTH (SDOH): HOW OFTEN DO YOU GET TOGETHER WITH FRIENDS OR RELATIVES?: MORE THAN THREE TIMES A WEEK

## 2025-01-02 NOTE — NURSING NOTE
"Leonel  24 year old    Chief Complaint   Patient presents with    Establish Care    Physical    Imm/Inj     Flu Shot    Imm/Inj     COVID-19 VACCINE            Blood pressure 120/73, pulse 64, temperature 98.7  F (37.1  C), temperature source Skin, resp. rate 16, height 1.93 m (6' 4\"), weight 99.8 kg (220 lb), SpO2 97%. Body mass index is 26.78 kg/m .    Patient Active Problem List   Diagnosis    Normocytic anemia              Wt Readings from Last 2 Encounters:   01/02/25 99.8 kg (220 lb)   01/06/23 94.9 kg (209 lb 1.9 oz)       BP Readings from Last 3 Encounters:   01/02/25 120/73   01/06/23 115/66   12/20/22 97/53                Current Outpatient Medications   Medication Sig Dispense Refill    dexmethylphenidate (FOCALIN XR) 10 MG 24 hr capsule Take 10 mg by mouth daily as needed.      [facility] [place] [facility] Natural Products (T-RELIEF CBD+13 SL)  (Patient not taking: Reported on 1/2/2025)       No current facility-administered medications for this visit.              Social History     Tobacco Use    Smoking status: Some Days     Types: Cigarettes    Smokeless tobacco: Never   Vaping Use    Vaping status: Former    Substances: Nicotine, Flavoring    Devices: Pre-filled or refillable cartridge   Substance Use Topics    Alcohol use: Yes     Alcohol/week: 5.0 - 7.0 standard drinks of alcohol     Types: 5 - 7 Standard drinks or equivalent per week    Drug use: Yes     Frequency: 4.0 times per week     Types: Marijuana              Health Maintenance Due   Topic Date Due    ADVANCE CARE PLANNING  Never done    MENINGITIS B IMMUNIZATION (2 of 2 - Bexsero SCDM 2-dose series) 02/14/2019    YEARLY PREVENTIVE VISIT  05/26/2023    INFLUENZA VACCINE (1) 09/01/2024    COVID-19 Vaccine (4 - 2024-25 season) 09/01/2024          Injectable Influenza Immunization Documentation    1.  Has the patient received the information for the injectable influenza vaccine? YES     2. Is the patient 6 months of age or older? YES     3. Does the patient have " "any of the following contraindications?         Severe allergy to eggs? No     Severe allergic reaction to previous influenza vaccines? No   Severe allergy to latex? No       History of Guillain-Crosbyton syndrome? No     Currently have a temperature greater than 100.4F? No        4.  Severely egg allergic patients should have flu vaccine eligibility assessed by an MD, RN, or pharmacist, and those who received flu vaccine should be observed for 15 min by an MD, RN, Pharmacist, Medical Technician, or member of clinic staff.\": YES    5. Latex-allergic patients should be given latex-free influenza vaccine Yes. Please reference the Vaccine latex table to determine if your clinic s product is latex-containing.       Vaccination given by Gloria Watts LPN on 1/2/2025 at 3:08 PM        January 2, 2025 2:16 PM  " Retention Suture Text: Retention sutures were placed to support the closure and prevent dehiscence.

## 2025-01-02 NOTE — PROGRESS NOTES
Preventive Care Visit  [facility]  Taye Aguilar MD, MD, Family Medicine  Jan 2, 2025  {Provider  Link to [facility] :442957}    {PROVIDER CHARTING PREFERENCE:866776}    Wyatt Castaneda is a 24 year old, presenting for the following:  Establish Care, Physical, Imm/Inj (Flu Shot), and Imm/Inj (COVID-19 VACCINE)    {(!) Visit Details have not yet been documented.  Please enter Visit Details and then use this list to pull in documentation. (Optional):799583}       HPI  ***  {MA/LPN/RN Pre-Provider Visit Orders- hCG/UA/Strep (Optional):718988}  {SUPERLIST (Optional):607572}  {additonal problems for provider to add (Optional):331066}  Health Care Directive  Patient does not have a Health Care Directive: {ADVANCE_DIRECTIVE_STATUS:809705}      1/2/2025   General Health   How would you rate your overall physical health? Excellent   Feel stress (tense, anxious, or unable to sleep) Only a little   (!) STRESS CONCERN      1/2/2025   Nutrition   Three or more servings of calcium each day? Yes   Diet: Regular (no restrictions)   How many servings of fruit and vegetables per day? 4 or more   How many sweetened beverages each day? 0-1         1/2/2025   Exercise   Days per week of moderate/strenous exercise 6 days   Average minutes spent exercising at this level 150+ min         1/2/2025   Social Factors   Frequency of gathering with friends or relatives More than three times a week   Worry food won't last until get money to buy more No   Food not last or not have enough money for food? No   Do you have housing? (Housing is defined as stable permanent housing and does not include staying ouside in a car, in a tent, in an abandoned building, in an overnight shelter, or couch-surfing.) Yes   Are you worried about losing your housing? No   Lack of transportation? No   Unable to get utilities (heat,electricity)? No         1/2/2025   Dental   Dentist two times every year? Yes            Today's PHQ-2 Score:        "1/2/2025     2:14 PM   PHQ-2 ( 1999 [employer])   Q1: Little interest or pleasure in doing things 0   Q2: Feeling down, depressed or hopeless 0   PHQ-2 Score 0           1/2/2025   Substance Use   Alcohol more than 3/day or more than 7/wk No   Do you use any other substances recreationally? (!) CANNABIS PRODUCTS    (!) KRATOM       Multiple values from one day are sorted in reverse-chronological order     Social History     Tobacco Use    Smoking status: Some Days     Types: Cigarettes    Smokeless tobacco: Never   Vaping Use    Vaping status: Former    Substances: Nicotine, Flavoring    Devices: Pre-filled or refillable cartridge   Substance Use Topics    Alcohol use: Yes     Alcohol/week: 5.0 - 7.0 standard drinks of alcohol     Types: 5 - 7 Standard drinks or equivalent per week    Drug use: Yes     Frequency: 4.0 times per week     Types: Marijuana     {Provider  If there are gaps in the social history shown above, please follow the link to update and then refresh the note Link to Social and Substance History :076976}      1/2/2025   STI Screening   New sexual partner(s) since last STI/HIV test? No         1/2/2025   Contraception/Family Planning   Questions about contraception or family planning No     {Provider  REQUIRED FOR AWV Use the storyboard to review patient history, after sections have been marked as reviewed, refresh note to capture documentation:153147}   Reviewed and updated as needed this visit by Provider      Problems               {HISTORY OPTIONS (Optional):374146}    {ROS Picklists (Optional):695407}     Objective    Exam  /73 (BP Location: Left arm, Patient Position: Sitting, Cuff Size: Adult Regular)   Pulse 64   Temp 98.7  F (37.1  C) (Skin)   Resp 16   Ht 1.93 m (6' 4\")   Wt 99.8 kg (220 lb)   SpO2 97%   BMI 26.78 kg/m     Estimated body mass index is 26.78 kg/m  as calculated from the following:    Height as of this encounter: 1.93 m (6' 4\").    Weight as of this encounter: " 99.8 kg (220 lb).    Physical Exam  {Exam Choices (Optional):344778}        Signed Electronically by: Taye Aguilar MD, MD  {Email feedback regarding this note to [email or web address]   :299475}   today to reestablish care and have a wellness visit.  He is up-to-date with most healthcare maintenance strategies.    With a seasonal influenza immunization along with a COVID booster will be given today.    Lipid panel and PSA, both pending    Stress echo has been ordered that he will get done sometime within the next calendar year.  Of course, if he develops significant chest pain while he is in [facility] [place], he should go to a local emergency room.  However, simply given his age and lack of family history of early heart disease, it is highly unlikely he is having any cardiovascular issues.    Occasional urinary hesitancy.  PSA pending.  Reassurance given.    I look forward to seeing Leonel back in the next 1 to 2 years.  He will reach out the meantime with any questions or concerns.      Signed Electronically by: Taye Aguilar MD

## 2025-07-31 ENCOUNTER — OFFICE VISIT (OUTPATIENT)
Dept: FAMILY MEDICINE | Facility: CLINIC | Age: 25
End: 2025-07-31
Payer: COMMERCIAL

## 2025-07-31 VITALS
OXYGEN SATURATION: 100 % | BODY MASS INDEX: 25.81 KG/M2 | WEIGHT: 212 LBS | TEMPERATURE: 97.5 F | DIASTOLIC BLOOD PRESSURE: 59 MMHG | SYSTOLIC BLOOD PRESSURE: 98 MMHG | HEART RATE: 45 BPM

## 2025-07-31 DIAGNOSIS — R68.89 CHANGE IN BLOOD PRESSURE: ICD-10-CM

## 2025-07-31 DIAGNOSIS — R05.3 PERSISTENT COUGH: Primary | ICD-10-CM

## 2025-07-31 ASSESSMENT — PAIN SCALES - GENERAL: PAINLEVEL_OUTOF10: NO PAIN (0)
